# Patient Record
Sex: FEMALE | Race: BLACK OR AFRICAN AMERICAN | NOT HISPANIC OR LATINO | Employment: FULL TIME | URBAN - METROPOLITAN AREA
[De-identification: names, ages, dates, MRNs, and addresses within clinical notes are randomized per-mention and may not be internally consistent; named-entity substitution may affect disease eponyms.]

---

## 2017-06-30 ENCOUNTER — HOSPITAL ENCOUNTER (EMERGENCY)
Facility: HOSPITAL | Age: 27
Discharge: HOME | End: 2017-06-30
Attending: EMERGENCY MEDICINE
Payer: MEDICAID

## 2017-06-30 VITALS
HEART RATE: 94 BPM | WEIGHT: 136 LBS | DIASTOLIC BLOOD PRESSURE: 76 MMHG | BODY MASS INDEX: 23.22 KG/M2 | RESPIRATION RATE: 18 BRPM | SYSTOLIC BLOOD PRESSURE: 118 MMHG | HEIGHT: 64 IN | OXYGEN SATURATION: 98 % | TEMPERATURE: 98 F

## 2017-06-30 DIAGNOSIS — F41.0 ANXIETY ATTACK: Primary | ICD-10-CM

## 2017-06-30 PROCEDURE — 99283 EMERGENCY DEPT VISIT LOW MDM: CPT

## 2017-06-30 RX ORDER — HYDROXYZINE PAMOATE 25 MG/1
25 CAPSULE ORAL EVERY 6 HOURS PRN
Qty: 20 CAPSULE | Refills: 0 | Status: SHIPPED | OUTPATIENT
Start: 2017-06-30 | End: 2017-10-06

## 2017-07-01 NOTE — ED PROVIDER NOTES
Encounter Date: 6/30/2017    SCRIBE #1 NOTE: I, Mark El, am scribing for, and in the presence of, Pushpa Gonzalez MD. Other sections scribed: HPI, ROS.       History     Chief Complaint   Patient presents with    Anxiety     Patient states that she has intermittent anxiety for 6-8 months now.      CC: Anxiety  HPI: This 27 y.o. female smoker with Hx of anxiety presents to the ED c/o frequent daily anxiety attacks recently that she has been unable to control. Pt reports throbbing HAs, SOB, feelings of anxiety, and sometimes shakiness in her extremities. She denies any Sx at time of evaluation, but she would like help controlling these episodes until she can start seeing a therapist again. She states that she drinks water and attempts to control her breathing during the episodes, often with little or no relief. She does not take any medications. She denies any notable or increased stress in her life. She denies SI, loss of appetite, difficulty sleeping. She denies suicidal or homicidal ideation.             Review of patient's allergies indicates:   Allergen Reactions    Paroxetine Other (See Comments)     Lethargy     Past Medical History:   Diagnosis Date    Anxiety     Leaky heart valve      Past Surgical History:   Procedure Laterality Date    CARDIAC SURGERY       History reviewed. No pertinent family history.  Social History   Substance Use Topics    Smoking status: Current Every Day Smoker     Packs/day: 0.50    Smokeless tobacco: Never Used    Alcohol use Yes      Comment: occassionally     Review of Systems   Constitutional: Negative for appetite change, chills and fever.   HENT: Negative for ear pain, rhinorrhea, sore throat, trouble swallowing and voice change.    Eyes: Negative for pain and visual disturbance.   Respiratory: Negative for cough, choking, chest tightness and shortness of breath (during anxiety attacks, resolved at present).    Gastrointestinal: Negative for abdominal pain,  constipation, nausea and vomiting.   Genitourinary: Negative for dysuria and frequency.   Musculoskeletal: Negative for arthralgias, back pain, myalgias and neck stiffness.   Skin: Negative for rash and wound.   Neurological: Negative for seizures, weakness and headaches (during anxiety attacks, resolved at present).   Psychiatric/Behavioral: Negative for agitation, confusion, sleep disturbance and suicidal ideas. The patient is not nervous/anxious (resolved at present).        Physical Exam     Initial Vitals [06/30/17 2027]   BP Pulse Resp Temp SpO2   (!) 122/58 98 16 98.4 °F (36.9 °C) 100 %      MAP       79.33         Physical Exam    Nursing note and vitals reviewed.  Constitutional: She appears well-developed and well-nourished. She is not diaphoretic. No distress.   HENT:   Head: Normocephalic and atraumatic.   Right Ear: External ear normal.   Left Ear: External ear normal.   Nose: Nose normal.   Mouth/Throat: Oropharynx is clear and moist. No oropharyngeal exudate.   Eyes: Conjunctivae and EOM are normal. Pupils are equal, round, and reactive to light. Right eye exhibits no discharge. Left eye exhibits no discharge. No scleral icterus.   Neck: Normal range of motion. Neck supple. No thyromegaly present. No tracheal deviation present. No JVD present.   Cardiovascular: Normal rate, regular rhythm, normal heart sounds and intact distal pulses. Exam reveals no gallop and no friction rub.    No murmur heard.  Pulmonary/Chest: Breath sounds normal. No stridor. No respiratory distress. She has no wheezes. She exhibits no tenderness.   Abdominal: Soft. Bowel sounds are normal. She exhibits no distension and no mass. There is no tenderness. There is no rebound and no guarding.   Musculoskeletal: Normal range of motion. She exhibits no edema or tenderness.   Neurological: She is alert and oriented to person, place, and time. She has normal strength. She displays normal reflexes. No cranial nerve deficit or sensory  deficit.   Skin: Skin is warm and dry. Capillary refill takes less than 2 seconds. No rash noted. No erythema.   Psychiatric: She has a normal mood and affect. Thought content normal.         ED Course   Procedures  Labs Reviewed - No data to display          Medical Decision Makin-year-old female with history of anxiety presents the emergency department complaining of frequent anxiety exacerbations.  Denies suicidal or homicidal ideation.  On exam she is afebrile with stable vital signs.  She is nontoxic appearing.  She is neurovascularly intact.  There is no indication for emergent PEC.  Clinically I suspect the patient likely has anxiety.  I considered but do not suspect sepsis, depression, bipolar disorder, schizophrenia, electrolyte abnormality, cardiac arrhythmia, acs, acute intra-thoracic abnormality.  I spoke to her about relaxation techniques.  She agrees to follow-up with her psychologist as well as primary care doctor.  Will start Vistaril to use when necessary anxiety.  Return precautions were given.  She agrees the plan            Scribe Attestation:   Scribe #1: I performed the above scribed service and the documentation accurately describes the services I performed. I attest to the accuracy of the note.    Attending Attestation:           Physician Attestation for Scribe:  Physician Attestation Statement for Scribe #1: I, Pushpa Gonzalez MD, reviewed documentation, as scribed by Mark Gallegos in my presence, and it is both accurate and complete.                 ED Course     Clinical Impression:   There were no encounter diagnoses.                           Pushpa Gonzalez MD  [phone]

## 2017-07-01 NOTE — DISCHARGE INSTRUCTIONS
Take medications as directed. Follow-up with your primary care doctor and psychologist. Return for any new or worsening complaints.

## 2017-07-01 NOTE — ED TRIAGE NOTES
Patient presents to the ED w/ c/o anxiety. Pt reports she started having anxiety attacks last night and she cannot make them stop. Patient reports she can usually control her anxiety with breathing techniques. Pt is also reporting frontal headache.

## 2017-08-08 ENCOUNTER — HOSPITAL ENCOUNTER (EMERGENCY)
Facility: HOSPITAL | Age: 27
Discharge: HOME | End: 2017-08-08
Attending: EMERGENCY MEDICINE
Payer: MEDICAID

## 2017-08-08 VITALS
WEIGHT: 126 LBS | BODY MASS INDEX: 21.51 KG/M2 | DIASTOLIC BLOOD PRESSURE: 57 MMHG | HEIGHT: 64 IN | OXYGEN SATURATION: 98 % | SYSTOLIC BLOOD PRESSURE: 114 MMHG | TEMPERATURE: 99 F | RESPIRATION RATE: 18 BRPM | HEART RATE: 85 BPM

## 2017-08-08 DIAGNOSIS — R51.9 NONINTRACTABLE HEADACHE, UNSPECIFIED CHRONICITY PATTERN, UNSPECIFIED HEADACHE TYPE: Primary | ICD-10-CM

## 2017-08-08 LAB
B-HCG UR QL: NEGATIVE
CTP QC/QA: YES
DEPRECATED S PYO AG THROAT QL EIA: NEGATIVE

## 2017-08-08 PROCEDURE — 87081 CULTURE SCREEN ONLY: CPT

## 2017-08-08 PROCEDURE — 87880 STREP A ASSAY W/OPTIC: CPT

## 2017-08-08 PROCEDURE — 81025 URINE PREGNANCY TEST: CPT | Performed by: EMERGENCY MEDICINE

## 2017-08-08 PROCEDURE — 99283 EMERGENCY DEPT VISIT LOW MDM: CPT

## 2017-08-08 PROCEDURE — 25000003 PHARM REV CODE 250: Performed by: NURSE PRACTITIONER

## 2017-08-08 RX ORDER — ACETAMINOPHEN 325 MG/1
650 TABLET ORAL
Status: COMPLETED | OUTPATIENT
Start: 2017-08-08 | End: 2017-08-08

## 2017-08-08 RX ADMIN — ACETAMINOPHEN 650 MG: 325 TABLET, FILM COATED ORAL at 04:08

## 2017-08-08 NOTE — ED PROVIDER NOTES
Encounter Date: 8/8/2017       History     Chief Complaint   Patient presents with    Anxiety     States she is having an anxiety attack and its giving her a HA    Headache     Chief complaint: Headache    History of present illness: Patient is a 27-year-old female who presents for a chief complaint of headache for emergent consideration.  She states that the headache began last night around 6 PM and is frontal and constant she states that nothing alleviates the pain bright light aggravates the pain there is no associated nausea or vomiting the pain does not radiate it is worse at night.  The headache is like previous headaches that was relieved by Tylenol current severity is 9/10.  She does report chills at the present time.  She has not taken any medications for this current headache.    The history is provided by the patient. No  was used.     Review of patient's allergies indicates:   Allergen Reactions    Paroxetine Other (See Comments)     Lethargy     Past Medical History:   Diagnosis Date    Anxiety     Leaky heart valve      Past Surgical History:   Procedure Laterality Date    CARDIAC SURGERY       History reviewed. No pertinent family history.  Social History   Substance Use Topics    Smoking status: Current Every Day Smoker     Packs/day: 0.50    Smokeless tobacco: Never Used    Alcohol use Yes      Comment: occassionally     Review of Systems   Constitutional: Positive for chills. Negative for fatigue and fever.   HENT: Negative for congestion, ear discharge, ear pain, postnasal drip, rhinorrhea, sinus pressure, sneezing, sore throat and voice change.    Eyes: Negative for discharge and itching.   Respiratory: Negative for cough, shortness of breath and wheezing.    Cardiovascular: Negative for chest pain, palpitations and leg swelling.   Gastrointestinal: Negative for abdominal pain, constipation, diarrhea, nausea and vomiting.   Endocrine: Negative for polydipsia,  polyphagia and polyuria.   Genitourinary: Negative for dysuria, frequency, hematuria, urgency, vaginal bleeding, vaginal discharge and vaginal pain.   Musculoskeletal: Negative for arthralgias and myalgias.   Skin: Negative for rash and wound.   Neurological: Positive for headaches. Negative for dizziness, seizures, syncope, weakness and numbness.   Hematological: Negative for adenopathy. Does not bruise/bleed easily.   Psychiatric/Behavioral: Negative for self-injury and suicidal ideas. The patient is not nervous/anxious.        Physical Exam     Initial Vitals [08/08/17 1451]   BP Pulse Resp Temp SpO2   (!) 108/58 110 18 99 °F (37.2 °C) 100 %      MAP       74.67         Physical Exam    Nursing note and vitals reviewed.  Constitutional: She appears well-developed and well-nourished.   HENT:   Head: Normocephalic and atraumatic.   Right Ear: External ear normal.   Left Ear: External ear normal.   Nose: Nose normal.   Mouth/Throat: Oropharyngeal exudate present.   Eyes: Conjunctivae and EOM are normal. Pupils are equal, round, and reactive to light. Right eye exhibits no discharge. Left eye exhibits no discharge.   Neck: Normal range of motion.   Abdominal: She exhibits no distension.   Musculoskeletal: Normal range of motion.   Neurological: She is alert and oriented to person, place, and time.   Skin: Skin is dry. Capillary refill takes less than 2 seconds.         ED Course   Procedures  Labs Reviewed   THROAT SCREEN, RAPID   CULTURE, STREP A,  THROAT   POCT URINE PREGNANCY             Medical Decision Making:   Initial Assessment:   27 y.o. female presents for emergent evaluation of headache  Differential Diagnosis:   Cephalgia, migraine headache, tension headache  ED Management:  Following a thorough history and physical, the patient was given Tylenol 650 as well as had a urine collection.  Urine was tested for pregnancy which was negative and a strep screen was done which was also negative.  Patient reported  anxiety during initial triage but was given several opportunities to discuss anxiety during which she did not again discuss it.  She reported that her pain decreased to an 8 following administration of Tylenol and was discharged to home to continue to improve.  Stated that she will follow up with her primary care provider.  Care of the patient discussed with Dr. Lima who agreed with the assessment and plan.                Attending Attestation:     Physician Attestation Statement for NP/PA:   I discussed this assessment and plan of this patient with the NP/PA, but I did not personally examine the patient. The face to face encounter was performed by the NP/PA.    Other NP/PA Attestation Additions:      Medical Decision Makin yo F w/ headache, subjective fever and chills.  Neck supple.  Doubt meningitis or SAH.  I agree with plan.                 ED Course     Clinical Impression:   The encounter diagnosis was Nonintractable headache, unspecified chronicity pattern, unspecified headache type.    Disposition:   Disposition: Discharged  Condition: Stable                        Luis Alfredo Chaudhry DNP  [phone]       Darrion Lima MD  [phone]

## 2017-08-08 NOTE — DISCHARGE INSTRUCTIONS
Return to the Emergency department for any worsening or failure to improve, otherwise follow up with your primary care provider.

## 2017-08-10 LAB
BACTERIA THROAT CULT: NORMAL
BACTERIA THROAT CULT: NORMAL

## 2017-10-06 ENCOUNTER — HOSPITAL ENCOUNTER (EMERGENCY)
Facility: HOSPITAL | Age: 27
Discharge: HOME | End: 2017-10-06
Attending: EMERGENCY MEDICINE
Payer: MEDICAID

## 2017-10-06 VITALS
SYSTOLIC BLOOD PRESSURE: 110 MMHG | TEMPERATURE: 98 F | RESPIRATION RATE: 16 BRPM | BODY MASS INDEX: 19.63 KG/M2 | OXYGEN SATURATION: 100 % | WEIGHT: 115 LBS | DIASTOLIC BLOOD PRESSURE: 62 MMHG | HEART RATE: 63 BPM | HEIGHT: 64 IN

## 2017-10-06 DIAGNOSIS — R06.02 SHORTNESS OF BREATH: Primary | ICD-10-CM

## 2017-10-06 DIAGNOSIS — R07.9 CHEST PAIN: ICD-10-CM

## 2017-10-06 LAB
B-HCG UR QL: NEGATIVE
CTP QC/QA: YES

## 2017-10-06 PROCEDURE — 81025 URINE PREGNANCY TEST: CPT | Performed by: EMERGENCY MEDICINE

## 2017-10-06 PROCEDURE — 99284 EMERGENCY DEPT VISIT MOD MDM: CPT | Mod: 25

## 2017-10-06 RX ORDER — HYDROXYZINE HYDROCHLORIDE 25 MG/1
25 TABLET, FILM COATED ORAL 3 TIMES DAILY PRN
Qty: 20 TABLET | Refills: 0 | Status: SHIPPED | OUTPATIENT
Start: 2017-10-06 | End: 2018-06-14

## 2017-10-06 NOTE — DISCHARGE INSTRUCTIONS
Please return to the Emergency Department for any new or worsening symptoms including: worsening or changes in your pain or symptoms, fever, chest pain, shortness of breath, loss of consciousness, dizziness, weakness, or any other concerns.     Please follow up with your Primary Care Provider within in the week. If you do not have one, you may contact the one listed on your discharge paperwork or you may also call the [facility] Appointment Desk at [phone] to schedule an appointment with one.     You may also follow up with the [facility] for counseling/service.     Please take all medication as prescribed.

## 2017-10-06 NOTE — ED PROVIDER NOTES
"Encounter Date: 10/6/2017    SCRIBE #1 NOTE: I, Amrita Suzi, am scribing for, and in the presence of,  Vidal Blank NP. I have scribed the following portions of the note - Other sections scribed: HPI and ROS.       History     Chief Complaint   Patient presents with    Chest Pain     pt c/o intermittent sharp chest pain with activity. pt reports pain subsides w/o activity.     CC: Anxiety    HPI: This 27 y.o. female with medical history of anxiety and leaky heart valve presents to the ED c/o anxiety that began about 1 hour ago. Pt states "my anxiety started coming out from nowhere", noting that she was standing up at work at the time when she began to experience difficulty breathing. She reports that she also began to experience a "sharp" pain to the right side of the chest during the episode, but notes that the pain lasted a couple of seconds and has since resolved. She presently rates her anxiety 8/10, but notes that her breathing has improved. Pt states that she usually experiences anxiety attacks "every once in a while", noting that she experienced her last episode 1x month ago. She adds that she normally experiences difficulty breathing with each attack, but notes that it has never been accompanied by chest pain. Pt denies any new stressors, recent long distance travels, use of birth control and leg swelling. No other associated symptoms. No attempted treatment reported. No alleviating factors.           The history is provided by the patient. No  was used.     Review of patient's allergies indicates:   Allergen Reactions    Paroxetine Other (See Comments)     Lethargy     Past Medical History:   Diagnosis Date    Anxiety     Leaky heart valve      Past Surgical History:   Procedure Laterality Date    CARDIAC SURGERY       History reviewed. No pertinent family history.  Social History   Substance Use Topics    Smoking status: Current Every Day Smoker     Packs/day: 0.50    " Smokeless tobacco: Never Used    Alcohol use No      Comment: occassionally     Review of Systems   Constitutional: Negative for chills and fever.   HENT: Negative for congestion, ear pain, rhinorrhea and sore throat.    Eyes: Negative for pain and visual disturbance.   Respiratory: Positive for shortness of breath. Negative for cough.    Cardiovascular: Positive for chest pain (right sided; sice resolved).   Gastrointestinal: Negative for abdominal pain, diarrhea, nausea and vomiting.   Genitourinary: Negative for dysuria.   Musculoskeletal: Negative for back pain and neck pain.   Skin: Negative for rash.   Neurological: Negative for headaches.   Psychiatric/Behavioral: The patient is nervous/anxious.        Physical Exam     Initial Vitals [10/06/17 1125]   BP Pulse Resp Temp SpO2   (!) 126/58 73 18 98.7 °F (37.1 °C) 100 %      MAP       80.67         Physical Exam    Nursing note and vitals reviewed.  Constitutional: She appears well-developed and well-nourished. She is not diaphoretic. She is cooperative.  Non-toxic appearance. She does not have a sickly appearance. No distress.   HENT:   Head: Normocephalic and atraumatic.   Right Ear: External ear normal.   Left Ear: External ear normal.   Nose: Nose normal.   Mouth/Throat: Uvula is midline and oropharynx is clear and moist. No trismus in the jaw.   Eyes: Conjunctivae and EOM are normal.   Neck: Normal range of motion. No spinous process tenderness and no muscular tenderness present. No tracheal deviation present. No neck rigidity.   Cardiovascular: Normal rate, regular rhythm and normal heart sounds. Exam reveals no gallop and no friction rub.    No murmur heard.  Pulses:       Radial pulses are 2+ on the right side, and 2+ on the left side.   Pulmonary/Chest: Effort normal and breath sounds normal. No accessory muscle usage or stridor. No tachypnea and no bradypnea. No respiratory distress. She has no decreased breath sounds. She has no wheezes. She has no  rhonchi. She has no rales. She exhibits tenderness.   Abdominal: Soft. There is no tenderness. There is no guarding.   Musculoskeletal: Normal range of motion.   Lymphadenopathy:     She has no cervical adenopathy.   Neurological: She is alert and oriented to person, place, and time. She has normal strength. No sensory deficit. Coordination and gait normal. GCS eye subscore is 4. GCS verbal subscore is 5. GCS motor subscore is 6.   Skin: Skin is warm, dry and intact. Capillary refill takes less than 2 seconds. No bruising and no rash noted. No cyanosis or erythema. Nails show no clubbing.   Psychiatric: She has a normal mood and affect. Her behavior is normal. Thought content normal.         ED Course   Procedures  Labs Reviewed   POCT URINE PREGNANCY     EKG Readings: (Independently Interpreted)   Initial Reading: No STEMI. Previous EKG: Compared with most recent EKG Previous EKG Date: May 6, 2016. Rhythm: Normal Sinus Rhythm. Heart Rate: 81. Ectopy: No Ectopy. Conduction: RBBB. Axis: Normal.          Medical Decision Making:   History:   Old Medical Records: I decided to obtain old medical records.  Old Records Summarized: records from previous admission(s).       <> Summary of Records: Patient with multiple previous ER visits for chest wall pain, not intractable headache, and anxiety attack/reaction.  Clinical Tests:   Radiological Study: Ordered and Reviewed  Medical Tests: Ordered and Reviewed    Additional MDM:   PERC Rule:   Age is greater than or equal to 50 = 0.0  Heart Rate is greater than or equal to 100 = 0.0  SaO2 on room air < 95% = 0.0  Unilateral leg swelling = 0.0  Hemoptysis = 0.0  Recent surgery or trauma = 0.0  Prior PE or DVT =  0.0  Hormone use = 0.00  PERC Score = 0    APC / Resident Notes:   This is an evaluation of a 27 y.o. female that presents to the Emergency Department for shortness of breath that began while at work today.  She reports this felt like her usual anxiety type symptoms.   She reports it is gradually been improving.  During this episode she did report a short, few second episode of right midsternal sharp, sticking chest pain.  She does report it has resolved. Physical Exam shows a non-toxic, afebrile, and well appearing female.  Ears and throat without evidence of infection.  Breath sounds clear and equal to auscultation.  Heart regular rhythm and normal rate.  Minimal tenderness palpation over the right anterior chest wall.  She appears calm at this time.  She is not tachycardic, not hypoxic, not tachypnea.  She is otherwise PERC negative.  Vital Signs Are Reassuring. If available, previous records reviewed. RESULTS: UPT negative.  Chest x-ray with no evidence of pneumothorax, pneumonia, or pleural effusion.  EKG normal sinus rhythm at a rate of 81 with no acute ischemia or arrhythmia.  She does have a right bundle-branch block this was pre-existing a previous EKG.    My overall impression is chest pain, noncardiac and shortness of breath-they believe this is likely related to patient's previous history of anxiety. I considered, but at this time, do not suspect pneumothorax, pneumonia, pleural effusion, cardiac ischemia, acute MI, pulmonary embolism.    During her stay in the ER, she does report continued improvement in symptoms.  When I spoke with her prior to discharge, she reports feeling much better.  Given her continued improvement while emergency Department, do feel she is stable for discharge with outpatient follow-up D/C Meds: Atarax. Additional D/C Information: [facility] info. The diagnosis, treatment plan, instructions for follow-up and reevaluation with PCP/[facility] as well as ED return precautions were discussed and understanding was verbalized. All questions or concerns have been addressed. This case was discussed with Dr. Momin who is in agreement with my assessment and plan. LOUIS Raya, FNP-C        Scribe Attestation:   Scribe #1: I performed the above scribed  service and the documentation accurately describes the services I performed. I attest to the accuracy of the note.    Attending Attestation:           Physician Attestation for Scribe:  Physician Attestation Statement for Scribe #1: I, Vidal Blank NP, reviewed documentation, as scribed by Amrita Archer in my presence, and it is both accurate and complete.                 ED Course      Clinical Impression:   The primary encounter diagnosis was Shortness of breath. A diagnosis of Chest pain was also pertinent to this visit.    Disposition:   Disposition: Discharged  Condition: Stable                        EVANGELIST Paulson  10/06/17 1514

## 2017-11-24 ENCOUNTER — HOSPITAL ENCOUNTER (EMERGENCY)
Facility: HOSPITAL | Age: 27
Discharge: HOME | End: 2017-11-24
Attending: EMERGENCY MEDICINE
Payer: MEDICAID

## 2017-11-24 VITALS
BODY MASS INDEX: 24.8 KG/M2 | TEMPERATURE: 100 F | WEIGHT: 140 LBS | HEIGHT: 63 IN | RESPIRATION RATE: 16 BRPM | OXYGEN SATURATION: 100 % | HEART RATE: 75 BPM | SYSTOLIC BLOOD PRESSURE: 103 MMHG | DIASTOLIC BLOOD PRESSURE: 57 MMHG

## 2017-11-24 DIAGNOSIS — N93.9 VAGINAL BLEEDING: Primary | ICD-10-CM

## 2017-11-24 DIAGNOSIS — D25.9 UTERINE LEIOMYOMA, UNSPECIFIED LOCATION: ICD-10-CM

## 2017-11-24 LAB
ANISOCYTOSIS BLD QL SMEAR: SLIGHT
B-HCG UR QL: NEGATIVE
BASOPHILS # BLD AUTO: 0.03 K/UL
BASOPHILS NFR BLD: 0.4 %
BILIRUB UR QL STRIP: NEGATIVE
CLARITY UR: CLEAR
COLOR UR: YELLOW
CTP QC/QA: YES
DACRYOCYTES BLD QL SMEAR: ABNORMAL
DIFFERENTIAL METHOD: ABNORMAL
EOSINOPHIL # BLD AUTO: 0.1 K/UL
EOSINOPHIL NFR BLD: 1.5 %
ERYTHROCYTE [DISTWIDTH] IN BLOOD BY AUTOMATED COUNT: 22.1 %
GLUCOSE UR QL STRIP: NEGATIVE
HCT VFR BLD AUTO: 25.4 %
HGB BLD-MCNC: 7.7 G/DL
HGB UR QL STRIP: ABNORMAL
HYPOCHROMIA BLD QL SMEAR: ABNORMAL
KETONES UR QL STRIP: NEGATIVE
LEUKOCYTE ESTERASE UR QL STRIP: NEGATIVE
LYMPHOCYTES # BLD AUTO: 2.3 K/UL
LYMPHOCYTES NFR BLD: 33.3 %
MCH RBC QN AUTO: 16 PG
MCHC RBC AUTO-ENTMCNC: 30.3 G/DL
MCV RBC AUTO: 53 FL
MICROSCOPIC COMMENT: ABNORMAL
MONOCYTES # BLD AUTO: 0.5 K/UL
MONOCYTES NFR BLD: 7 %
NEUTROPHILS # BLD AUTO: 3.9 K/UL
NEUTROPHILS NFR BLD: 57.8 %
NITRITE UR QL STRIP: NEGATIVE
OVALOCYTES BLD QL SMEAR: ABNORMAL
PH UR STRIP: 6 [PH] (ref 5–8)
PLATELET # BLD AUTO: 453 K/UL
PLATELET BLD QL SMEAR: ABNORMAL
PMV BLD AUTO: ABNORMAL FL
POIKILOCYTOSIS BLD QL SMEAR: ABNORMAL
PROT UR QL STRIP: NEGATIVE
RBC # BLD AUTO: 4.8 M/UL
RBC #/AREA URNS HPF: 60 /HPF (ref 0–4)
SCHISTOCYTES BLD QL SMEAR: PRESENT
SP GR UR STRIP: 1.02 (ref 1–1.03)
SQUAMOUS #/AREA URNS HPF: 5 /HPF
TARGETS BLD QL SMEAR: ABNORMAL
URN SPEC COLLECT METH UR: ABNORMAL
UROBILINOGEN UR STRIP-ACNC: NEGATIVE EU/DL
WBC # BLD AUTO: 6.76 K/UL
WBC #/AREA URNS HPF: 1 /HPF (ref 0–5)

## 2017-11-24 PROCEDURE — 81000 URINALYSIS NONAUTO W/SCOPE: CPT

## 2017-11-24 PROCEDURE — 85025 COMPLETE CBC W/AUTO DIFF WBC: CPT

## 2017-11-24 PROCEDURE — 81025 URINE PREGNANCY TEST: CPT | Performed by: EMERGENCY MEDICINE

## 2017-11-24 PROCEDURE — 99285 EMERGENCY DEPT VISIT HI MDM: CPT

## 2017-11-24 PROCEDURE — 25000003 PHARM REV CODE 250: Performed by: NURSE PRACTITIONER

## 2017-11-24 RX ORDER — FERROUS SULFATE 325(65) MG
325 TABLET ORAL 2 TIMES DAILY
Qty: 180 TABLET | Refills: 0 | Status: SHIPPED | OUTPATIENT
Start: 2017-11-24 | End: 2018-02-22

## 2017-11-24 RX ORDER — NAPROXEN 500 MG/1
500 TABLET ORAL ONCE
Status: COMPLETED | OUTPATIENT
Start: 2017-11-24 | End: 2017-11-24

## 2017-11-24 RX ORDER — MAG HYDROX/ALUMINUM HYD/SIMETH 200-200-20
5 SUSPENSION, ORAL (FINAL DOSE FORM) ORAL
Status: COMPLETED | OUTPATIENT
Start: 2017-11-24 | End: 2017-11-24

## 2017-11-24 RX ORDER — NAPROXEN 500 MG/1
500 TABLET ORAL 2 TIMES DAILY WITH MEALS
Qty: 60 TABLET | Refills: 0 | Status: SHIPPED | OUTPATIENT
Start: 2017-11-24 | End: 2018-06-14

## 2017-11-24 RX ORDER — OXYCODONE AND ACETAMINOPHEN 5; 325 MG/1; MG/1
1 TABLET ORAL
Qty: 15 TABLET | Refills: 0 | Status: SHIPPED | OUTPATIENT
Start: 2017-11-24 | End: 2018-06-14

## 2017-11-24 RX ORDER — NORETHINDRONE ACETATE AND ETHINYL ESTRADIOL 1.5-30(21)
1 KIT ORAL DAILY
Qty: 30 TABLET | Refills: 6 | Status: SHIPPED | OUTPATIENT
Start: 2017-11-24 | End: 2018-10-05

## 2017-11-24 RX ADMIN — NAPROXEN 500 MG: 500 TABLET ORAL at 12:11

## 2017-11-24 RX ADMIN — ALUMINUM HYDROXIDE, MAGNESIUM HYDROXIDE, AND SIMETHICONE 5 ML: 200; 200; 20 SUSPENSION ORAL at 12:11

## 2017-11-24 NOTE — DISCHARGE INSTRUCTIONS
Your ultrasound shows that you have multiple small fibroids in your uterus.  This is most likely the cause of your pelvic cramping, severe bleeding, and anemia.    Start the birth control today.    Take naproxen for pain and inflammation.  Take percocet for BREAKTHROUGH pain.  Percocet may cause drowsiness, so please use with caution.    Take iron tablets twice daily to help build up your blood.  Do not take Iron on an empty stomach and try to eat lots of fiber in your diet to prevent constipation.  You can take COLACE or DOCUSATE (available over-the-counter) as a stool softener if you become constipated.    Follow-up with gynecology as soon as possible for further evaluation of your bleeding and fibroids.  If you do not have a gynecologist, you can call Dr. Celina Cain for an appointment or refer to our community resources page for nearby clinics.    Please note that birth control can increase your risk for developing blood clots.  Consider quitting smoking or smoking less, since both increase your risk for blood clots.    Return to the ER for any new or worsening symptoms or concerns.

## 2017-11-24 NOTE — ED PROVIDER NOTES
"Encounter Date: 11/24/2017       History     Chief Complaint   Patient presents with    Vaginal Bleeding     "I havent stopped bleeding from my cycle since last month. I'm cramping and bleeding heavy."      This is a 27-year-old female with a history of anxiety that comes to the emergency room complaining of severe vaginal bleeding this morning.  Patient reports that she is expected to have her next.  Starting on the 10th of next month, but she began bleeding severely this morning upon awakening.  She reports saturating 4 pads within 2-3 hours; denies history of irregular or severe periods, ovarian cysts, endometriosis, fibroids.  She does report a history of anemia during her previous pregnancy, but believes that it has resolved since then.  She denies any prior treatments with medications for her pain.  She also denies fevers, chills, body aches, back pain, vaginal pain, urinary symptoms, nausea, or vomiting.           Review of patient's allergies indicates:   Allergen Reactions    Paroxetine Other (See Comments)     Lethargy     Past Medical History:   Diagnosis Date    Anxiety     Leaky heart valve      Past Surgical History:   Procedure Laterality Date    CARDIAC SURGERY       History reviewed. No pertinent family history.  Social History   Substance Use Topics    Smoking status: Current Every Day Smoker     Packs/day: 0.50     Types: Cigarettes    Smokeless tobacco: Never Used    Alcohol use Yes      Comment: occassionally     Review of Systems   Constitutional: Negative for chills and fever.   Respiratory: Negative for cough and shortness of breath.    Cardiovascular: Negative for chest pain and palpitations.   Gastrointestinal: Negative for abdominal pain, diarrhea, nausea and vomiting.   Genitourinary: Positive for pelvic pain and vaginal bleeding. Negative for dysuria and vaginal pain.   Musculoskeletal: Negative for back pain.   Neurological: Negative for dizziness, weakness, numbness and " headaches.       Physical Exam     Initial Vitals [11/24/17 1108]   BP Pulse Resp Temp SpO2   119/60 87 16 98.7 °F (37.1 °C) 100 %      MAP       79.67         Physical Exam    Nursing note and vitals reviewed.  Constitutional: Vital signs are normal. She appears well-developed and well-nourished. She is not diaphoretic. She is active and cooperative. She does not appear ill. She appears distressed (crying, she appears uncomfortable).   Eyes: Pupils are equal, round, and reactive to light.   Abdominal: Soft. Normal appearance. She exhibits no distension and no mass. There is no hepatosplenomegaly. There is tenderness (mild pelvic). There is no rigidity, no rebound, no guarding, no CVA tenderness and negative Bustamante's sign. No hernia.   Genitourinary: Cervix exhibits no motion tenderness, no discharge and no friability. Right adnexum displays no mass and no tenderness. Left adnexum displays no mass and no tenderness. There is bleeding (scant blood in vault with small clots) in the vagina. No erythema or tenderness in the vagina. No foreign body in the vagina. No signs of injury around the vagina. No vaginal discharge found.   Genitourinary Comments: External genitalia normal in appearance   Neurological: She is alert and oriented to person, place, and time.         ED Course   Procedures  Labs Reviewed   CBC W/ AUTO DIFFERENTIAL - Abnormal; Notable for the following:        Result Value    Hemoglobin 7.7 (*)     Hematocrit 25.4 (*)     MCV 53 (*)     MCH 16.0 (*)     MCHC 30.3 (*)     RDW 22.1 (*)     Platelets 453 (*)     Platelet Estimate Increased (*)     All other components within normal limits   URINALYSIS - Abnormal; Notable for the following:     Occult Blood UA 3+ (*)     All other components within normal limits   URINALYSIS MICROSCOPIC - Abnormal; Notable for the following:     RBC, UA 60 (*)     All other components within normal limits   POCT URINE PREGNANCY                Additional MDM:   Comments:  This is an urgent evaluation of a 27-year-old female that presents to the emergency room complaining of severe pelvic pain and vaginal bleeding today.  Patient denies a history of irregular periods; she is 2 weeks early for her.  Her UPT is negative.  She reports saturating 4 pads within 2 hours.  On exam, the patient is crying in pain, but is afebrile and with normal vital signs.  She does not appear ill or toxic.  There is no focal tenderness along the pelvis on my exam.  Differentials included: Anemia, ovarian cysts, uterine fibroids, endometriosis, UTI, vaginal infection.  Labs notable for an H&H of 7.7 and 25.4.  There is also significant microcytosis.  Possibly iron deficiency anemia or thalassemia?  Pelvic exam revealed a small amount of blood in the vaginal vault with clots.  There were no adnexal masses or tenderness.  I am not concerned for cervicitis, TOA, torsion.  Transvaginal ultrasound revealed 4 small uterine fibroids, the largest of which measures 1.6 cm . there was normal Doppler flow to both ovaries.  There is no free fluid identified within the pelvis.  The results were discussed with the patient.  She denies any headache, dizziness, lightheadedness, weakness, shortness of breath, palpitations.  Her medical record, she has been anemic in the past and I suspect that her baseline is low.  Given that she is asymptomatic, I believe blood transfusion can be deferred at this time as long as the bleeding can be controlled.  Will start on oral contraceptive today.  The patient denies a history of prior thrombus, but is a smoker.  I explained to her that smoking and taking OCP but her at higher risk for blood clots and that she should quit smoking as soon as possible or abstain while she is taking OCP.  I also cautioned her to return to the emergency room for any symptoms of a PE or DVT.  Patient verbalized understanding.  Also will start on iron supplementation to be taken twice daily.  Rx naproxen and  Percocet for pain control.  She was given strict return precautions for any new or worsening symptoms or concerns, including but not limited to dizziness, weakness, syncope..                 ED Course      Clinical Impression:   The primary encounter diagnosis was Vaginal bleeding. A diagnosis of Uterine leiomyoma, unspecified location was also pertinent to this visit.    Disposition:   Disposition: Discharged  Condition: Stable                        Nena Calderon NP  11/24/17 6482

## 2017-11-28 ENCOUNTER — HOSPITAL ENCOUNTER (EMERGENCY)
Facility: HOSPITAL | Age: 27
Discharge: HOME | End: 2017-11-28
Attending: EMERGENCY MEDICINE
Payer: MEDICAID

## 2017-11-28 VITALS
DIASTOLIC BLOOD PRESSURE: 59 MMHG | OXYGEN SATURATION: 100 % | BODY MASS INDEX: 23.92 KG/M2 | HEART RATE: 58 BPM | SYSTOLIC BLOOD PRESSURE: 109 MMHG | WEIGHT: 135 LBS | RESPIRATION RATE: 20 BRPM | HEIGHT: 63 IN | TEMPERATURE: 99 F

## 2017-11-28 DIAGNOSIS — R42 DIZZINESS: ICD-10-CM

## 2017-11-28 DIAGNOSIS — N92.1 MENORRHAGIA WITH IRREGULAR CYCLE: Primary | ICD-10-CM

## 2017-11-28 DIAGNOSIS — N73.0 PID (ACUTE PELVIC INFLAMMATORY DISEASE): ICD-10-CM

## 2017-11-28 DIAGNOSIS — R10.32 LLQ PAIN: ICD-10-CM

## 2017-11-28 DIAGNOSIS — R11.2 NAUSEA AND VOMITING, INTRACTABILITY OF VOMITING NOT SPECIFIED, UNSPECIFIED VOMITING TYPE: ICD-10-CM

## 2017-11-28 LAB
ALBUMIN SERPL BCP-MCNC: 3.8 G/DL
ALP SERPL-CCNC: 52 U/L
ALT SERPL W/O P-5'-P-CCNC: 10 U/L
ANION GAP SERPL CALC-SCNC: 9 MMOL/L
ANISOCYTOSIS BLD QL SMEAR: ABNORMAL
APTT BLDCRRT: 23.8 SEC
AST SERPL-CCNC: 15 U/L
B-HCG UR QL: NEGATIVE
BASOPHILS # BLD AUTO: 0.03 K/UL
BASOPHILS NFR BLD: 0.5 %
BILIRUB SERPL-MCNC: 0.3 MG/DL
BILIRUB UR QL STRIP: NEGATIVE
BUN SERPL-MCNC: 9 MG/DL
CALCIUM SERPL-MCNC: 9.4 MG/DL
CHLORIDE SERPL-SCNC: 109 MMOL/L
CLARITY UR: CLEAR
CO2 SERPL-SCNC: 25 MMOL/L
COLOR UR: YELLOW
CREAT SERPL-MCNC: 0.8 MG/DL
CTP QC/QA: YES
DACRYOCYTES BLD QL SMEAR: ABNORMAL
DIFFERENTIAL METHOD: ABNORMAL
EOSINOPHIL # BLD AUTO: 0 K/UL
EOSINOPHIL NFR BLD: 0.7 %
ERYTHROCYTE [DISTWIDTH] IN BLOOD BY AUTOMATED COUNT: 20.2 %
EST. GFR  (AFRICAN AMERICAN): >60 ML/MIN/1.73 M^2
EST. GFR  (NON AFRICAN AMERICAN): >60 ML/MIN/1.73 M^2
GLUCOSE SERPL-MCNC: 88 MG/DL
GLUCOSE UR QL STRIP: NEGATIVE
HCT VFR BLD AUTO: 29.3 %
HGB BLD-MCNC: 8.6 G/DL
HGB UR QL STRIP: NEGATIVE
HYPOCHROMIA BLD QL SMEAR: ABNORMAL
INR PPP: 1.1
KETONES UR QL STRIP: NEGATIVE
LEUKOCYTE ESTERASE UR QL STRIP: NEGATIVE
LYMPHOCYTES # BLD AUTO: 2.2 K/UL
LYMPHOCYTES NFR BLD: 35.9 %
MCH RBC QN AUTO: 16 PG
MCHC RBC AUTO-ENTMCNC: 29.4 G/DL
MCV RBC AUTO: 55 FL
MONOCYTES # BLD AUTO: 0.4 K/UL
MONOCYTES NFR BLD: 6.5 %
NEUTROPHILS # BLD AUTO: 3.5 K/UL
NEUTROPHILS NFR BLD: 56.7 %
NITRITE UR QL STRIP: NEGATIVE
OVALOCYTES BLD QL SMEAR: ABNORMAL
PH UR STRIP: 5 [PH] (ref 5–8)
PLATELET # BLD AUTO: 512 K/UL
PLATELET BLD QL SMEAR: ABNORMAL
PMV BLD AUTO: ABNORMAL FL
POIKILOCYTOSIS BLD QL SMEAR: ABNORMAL
POLYCHROMASIA BLD QL SMEAR: ABNORMAL
POTASSIUM SERPL-SCNC: 3.5 MMOL/L
PROT SERPL-MCNC: 7.7 G/DL
PROT UR QL STRIP: NEGATIVE
PROTHROMBIN TIME: 11.7 SEC
RBC # BLD AUTO: 5.37 M/UL
SCHISTOCYTES BLD QL SMEAR: PRESENT
SODIUM SERPL-SCNC: 143 MMOL/L
SP GR UR STRIP: >1.03 (ref 1–1.03)
TARGETS BLD QL SMEAR: ABNORMAL
URN SPEC COLLECT METH UR: ABNORMAL
UROBILINOGEN UR STRIP-ACNC: ABNORMAL EU/DL
WBC # BLD AUTO: 6.13 K/UL

## 2017-11-28 PROCEDURE — 85730 THROMBOPLASTIN TIME PARTIAL: CPT

## 2017-11-28 PROCEDURE — 99284 EMERGENCY DEPT VISIT MOD MDM: CPT | Mod: 25

## 2017-11-28 PROCEDURE — 63600175 PHARM REV CODE 636 W HCPCS: Performed by: PHYSICIAN ASSISTANT

## 2017-11-28 PROCEDURE — 85025 COMPLETE CBC W/AUTO DIFF WBC: CPT

## 2017-11-28 PROCEDURE — 85610 PROTHROMBIN TIME: CPT

## 2017-11-28 PROCEDURE — 81003 URINALYSIS AUTO W/O SCOPE: CPT

## 2017-11-28 PROCEDURE — 25000003 PHARM REV CODE 250: Performed by: PHYSICIAN ASSISTANT

## 2017-11-28 PROCEDURE — 93010 ELECTROCARDIOGRAM REPORT: CPT | Mod: ,,, | Performed by: INTERNAL MEDICINE

## 2017-11-28 PROCEDURE — 96374 THER/PROPH/DIAG INJ IV PUSH: CPT

## 2017-11-28 PROCEDURE — 81025 URINE PREGNANCY TEST: CPT | Performed by: NURSE PRACTITIONER

## 2017-11-28 PROCEDURE — 96372 THER/PROPH/DIAG INJ SC/IM: CPT

## 2017-11-28 PROCEDURE — 93005 ELECTROCARDIOGRAM TRACING: CPT

## 2017-11-28 PROCEDURE — 96361 HYDRATE IV INFUSION ADD-ON: CPT

## 2017-11-28 PROCEDURE — 80053 COMPREHEN METABOLIC PANEL: CPT

## 2017-11-28 RX ORDER — CEFTRIAXONE 500 MG/1
250 INJECTION, POWDER, FOR SOLUTION INTRAMUSCULAR; INTRAVENOUS
Status: COMPLETED | OUTPATIENT
Start: 2017-11-28 | End: 2017-11-28

## 2017-11-28 RX ORDER — DICYCLOMINE HYDROCHLORIDE 10 MG/ML
20 INJECTION INTRAMUSCULAR
Status: COMPLETED | OUTPATIENT
Start: 2017-11-28 | End: 2017-11-28

## 2017-11-28 RX ORDER — SODIUM CHLORIDE 9 MG/ML
1000 INJECTION, SOLUTION INTRAVENOUS
Status: COMPLETED | OUTPATIENT
Start: 2017-11-28 | End: 2017-11-28

## 2017-11-28 RX ORDER — MORPHINE SULFATE 5 MG/ML
4 INJECTION, SOLUTION INTRAMUSCULAR; INTRAVENOUS
Status: DISCONTINUED | OUTPATIENT
Start: 2017-11-28 | End: 2017-11-28

## 2017-11-28 RX ORDER — DOXYCYCLINE HYCLATE 100 MG
100 TABLET ORAL
Status: COMPLETED | OUTPATIENT
Start: 2017-11-28 | End: 2017-11-28

## 2017-11-28 RX ORDER — IBUPROFEN 600 MG/1
600 TABLET ORAL EVERY 6 HOURS PRN
Qty: 20 TABLET | Refills: 0 | Status: SHIPPED | OUTPATIENT
Start: 2017-11-28 | End: 2017-12-03

## 2017-11-28 RX ORDER — DOXYCYCLINE 100 MG/1
100 CAPSULE ORAL EVERY 12 HOURS
Qty: 28 CAPSULE | Refills: 0 | Status: SHIPPED | OUTPATIENT
Start: 2017-11-28 | End: 2017-12-12

## 2017-11-28 RX ORDER — ONDANSETRON 4 MG/1
4 TABLET, ORALLY DISINTEGRATING ORAL EVERY 6 HOURS PRN
Qty: 15 TABLET | Refills: 0 | Status: SHIPPED | OUTPATIENT
Start: 2017-11-28 | End: 2017-12-03

## 2017-11-28 RX ORDER — ONDANSETRON 2 MG/ML
4 INJECTION INTRAMUSCULAR; INTRAVENOUS
Status: COMPLETED | OUTPATIENT
Start: 2017-11-28 | End: 2017-11-28

## 2017-11-28 RX ORDER — DICYCLOMINE HYDROCHLORIDE 20 MG/1
20 TABLET ORAL 4 TIMES DAILY
Qty: 28 TABLET | Refills: 0 | Status: SHIPPED | OUTPATIENT
Start: 2017-11-28 | End: 2017-12-05

## 2017-11-28 RX ADMIN — CEFTRIAXONE SODIUM 250 MG: 500 INJECTION, POWDER, FOR SOLUTION INTRAMUSCULAR; INTRAVENOUS at 12:11

## 2017-11-28 RX ADMIN — ONDANSETRON 4 MG: 2 INJECTION INTRAMUSCULAR; INTRAVENOUS at 11:11

## 2017-11-28 RX ADMIN — DOXYCYCLINE HYCLATE 100 MG: 100 TABLET, COATED ORAL at 12:11

## 2017-11-28 RX ADMIN — DICYCLOMINE HYDROCHLORIDE 20 MG: 10 INJECTION INTRAMUSCULAR at 12:11

## 2017-11-28 RX ADMIN — SODIUM CHLORIDE 1000 ML: 0.9 INJECTION, SOLUTION INTRAVENOUS at 11:11

## 2017-11-28 NOTE — ED PROVIDER NOTES
"Encounter Date: 11/28/2017    SCRIBE #1 NOTE: I, Susannah Frankel, am scribing for, and in the presence of,  Yazmin Cobb PA-C. I have scribed the following portions of the note - Other sections scribed: ROS and HPI.       History     Chief Complaint   Patient presents with    Vaginal Bleeding     " i'm bleeding, cramping" reports being informed if bleeding didn't stop possible blood tranfusion, reports vag bleeding x 4 days, irregular menstrual      CC: Vaginal Bleeding    HPI: This 27 y.o. female presents to the ED complaining of vaginal bleeding for last 4-5 days. Bleeding is worse today and increased from 7 tampons per day to 6 regular tampons in the last 8 hours prior to arrival today. She was evaluated at this facility 2 days ago and diagnosed with fibroids and instructed to follow up with OBGYN. However, pt states she has not established follow up. She is compliant with Iron pills and OCPs prescribed at last ER visit. She was told to return to ER if her bleeding worsened which is why she presented today. She reports lower abdominal cramps consistent with her prior menses but more severe, 10/10. She reports dizziness consistent with her periods. Since her last visit, pt reports she developed nausea and 3 episodes of emesis. Denies hx of blood transfusion. Denies hx of blood disorder.       The history is provided by the patient. No  was used.     Review of patient's allergies indicates:   Allergen Reactions    Paroxetine Other (See Comments)     Lethargy     Past Medical History:   Diagnosis Date    Anxiety     Leaky heart valve      Past Surgical History:   Procedure Laterality Date    CARDIAC SURGERY       History reviewed. No pertinent family history.  Social History   Substance Use Topics    Smoking status: Current Every Day Smoker     Packs/day: 0.50     Types: Cigarettes    Smokeless tobacco: Never Used    Alcohol use Yes      Comment: occassionally     Review of Systems "   Constitutional: Positive for appetite change. Negative for chills and fever.   Respiratory: Negative for shortness of breath.    Cardiovascular: Negative for chest pain.   Gastrointestinal: Positive for abdominal pain and vomiting. Negative for nausea.   Genitourinary: Positive for vaginal bleeding. Negative for dysuria, frequency and urgency.   Neurological: Positive for dizziness.       Physical Exam     Initial Vitals [11/28/17 0921]   BP Pulse Resp Temp SpO2   119/61 109 18 98.9 °F (37.2 °C) 100 %      MAP       80.33         Physical Exam    Nursing note and vitals reviewed.  Constitutional: She appears well-developed and well-nourished.   HENT:   Head: Normocephalic.   Right Ear: External ear normal.   Left Ear: External ear normal.   Nose: Nose normal.   Mouth/Throat: Oropharynx is clear and moist.   Eyes: Conjunctivae are normal.   Cardiovascular: Normal rate and regular rhythm. Exam reveals no gallop and no friction rub.    No murmur heard.  Pulmonary/Chest: Breath sounds normal. No respiratory distress. She has no wheezes. She has no rhonchi. She has no rales.   Abdominal: Soft. Bowel sounds are normal. She exhibits no distension. There is tenderness in the suprapubic area and left lower quadrant. There is no rigidity, no rebound, no guarding, no CVA tenderness, no tenderness at McBurney's point and negative Bustamante's sign.   Genitourinary:   Genitourinary Comments: Scant amount of blood in vaginal vault with mild bleeding from cervical os. There is left adnexal TTp and CMT with no palpable masses.    Musculoskeletal: Normal range of motion.   Lymphadenopathy:     She has no cervical adenopathy.   Neurological: She is alert. She has normal strength. No cranial nerve deficit or sensory deficit.   Skin: Skin is warm and dry.   Psychiatric: She has a normal mood and affect.         ED Course   Procedures  Labs Reviewed   CBC W/ AUTO DIFFERENTIAL - Abnormal; Notable for the following:        Result Value     Hemoglobin 8.6 (*)     Hematocrit 29.3 (*)     MCV 55 (*)     MCH 16.0 (*)     MCHC 29.4 (*)     RDW 20.2 (*)     Platelets 512 (*)     Platelet Estimate Increased (*)     All other components within normal limits   COMPREHENSIVE METABOLIC PANEL - Abnormal; Notable for the following:     Alkaline Phosphatase 52 (*)     All other components within normal limits   URINALYSIS - Abnormal; Notable for the following:     Specific Gravity, UA >1.030 (*)     Urobilinogen, UA 4.0-6.0 (*)     All other components within normal limits   APTT   PROTIME-INR   POCT URINE PREGNANCY             Medical Decision Making:   Initial Assessment:   Pt is a 26 y/o female with history of anemia who presents for evaluation of heavy vaginal bleeding (6 tampons in past 8 hours from 7 tampons per day) with lower abdominal cramping and dizziness x 5 days and 2 day history of NV.   Differential Diagnosis:   Menorrhagia, ectopic pregnancy, threatened AB, complete AB, anemia, UTI, STI, PID, TOA, viral syndrome, acute abdomen  ED Management:  Pt was evaluated at this facility for these symptoms 2 days ago and diagnosed with fibroids and instructed to begin taking iron pills for anemia and OCPs and follow up with OBGYN. Pt returned to ER today due to increased bleeding and development of nausea and vomiting within the past 2 days. Pt is afebrile, tachycardic, appears fatigued in NAD. Oral mucosa dry. Mild lower suprapubic and LLQ TTP with no peritoneal signs. Pelvic exam remarkable for CMT and left adnexal TTP. Will treat pt for PID today. Rocephin and doxycycline given in ED. UPT negative. Pt given IVF zofran and Bentyl in the ED. She reports feeling much better. CBC with anemia but improved from labs drawn previously. No transfusion required at this time. Based on exam it appears bleeding is slowing. I think this is menorrhagia 2/2 fibroids. Instructed pt to continue taking OCP and Iron pills as previously prescribed. OBGYN follow up as soon as  possible. ER return precautions discussedincluding worsening symptoms, heavier bleeding, syncope, or as needed. Dsicussed pt with Dr. Araujo who agrees with assessment and plan.             Scribe Attestation:   Scribe #1: I performed the above scribed service and the documentation accurately describes the services I performed. I attest to the accuracy of the note.    Attending Attestation:           Physician Attestation for Scribe:  Physician Attestation Statement for Scribe #1: I,  Yazmin Cobb PA-C, reviewed documentation, as scribed by Susannah Frankel in my presence, and it is both accurate and complete.                 ED Course      Clinical Impression:   The primary encounter diagnosis was Menorrhagia with irregular cycle. Diagnoses of Dizziness, LLQ pain, PID (acute pelvic inflammatory disease), and Nausea and vomiting, intractability of vomiting not specified, unspecified vomiting type were also pertinent to this visit.                           Yazmin Cobb PA-C  11/29/17 2517

## 2017-11-28 NOTE — DISCHARGE INSTRUCTIONS
Please take Doxycycline as prescribed for PID.    You can take Ibuprofen and Bentyl as needed for abdominal pain and Zofran as needed for nausea.     Follow up with OBGYN as soon as possible for evaluation and treatment of your vaginal bleeding.    Continue taking iron pills and birth control as previously prescribed.     Return to ER if you develop worsening symptoms, worsening dizziness or lightheadedness, chest pain or as needed.

## 2018-04-18 ENCOUNTER — HOSPITAL ENCOUNTER (EMERGENCY)
Facility: HOSPITAL | Age: 28
Discharge: HOME | End: 2018-04-18
Attending: EMERGENCY MEDICINE
Payer: MEDICAID

## 2018-04-18 VITALS
RESPIRATION RATE: 16 BRPM | BODY MASS INDEX: 21.85 KG/M2 | HEART RATE: 65 BPM | OXYGEN SATURATION: 100 % | DIASTOLIC BLOOD PRESSURE: 66 MMHG | SYSTOLIC BLOOD PRESSURE: 113 MMHG | WEIGHT: 128 LBS | TEMPERATURE: 98 F | HEIGHT: 64 IN

## 2018-04-18 DIAGNOSIS — R42 DIZZY: Primary | ICD-10-CM

## 2018-04-18 DIAGNOSIS — Z86.59 HISTORY OF ANXIETY: ICD-10-CM

## 2018-04-18 DIAGNOSIS — R07.89 CHEST DISCOMFORT: ICD-10-CM

## 2018-04-18 LAB
B-HCG UR QL: NEGATIVE
CTP QC/QA: YES
POCT GLUCOSE: 78 MG/DL (ref 70–110)

## 2018-04-18 PROCEDURE — 99284 EMERGENCY DEPT VISIT MOD MDM: CPT | Mod: 25

## 2018-04-18 PROCEDURE — 93010 ELECTROCARDIOGRAM REPORT: CPT | Mod: ,,, | Performed by: INTERNAL MEDICINE

## 2018-04-18 PROCEDURE — 82962 GLUCOSE BLOOD TEST: CPT

## 2018-04-18 PROCEDURE — 25000003 PHARM REV CODE 250: Performed by: PHYSICIAN ASSISTANT

## 2018-04-18 PROCEDURE — 81025 URINE PREGNANCY TEST: CPT | Performed by: EMERGENCY MEDICINE

## 2018-04-18 PROCEDURE — 93005 ELECTROCARDIOGRAM TRACING: CPT

## 2018-04-18 RX ORDER — LORAZEPAM 0.5 MG/1
2 TABLET ORAL
Status: COMPLETED | OUTPATIENT
Start: 2018-04-18 | End: 2018-04-18

## 2018-04-18 RX ADMIN — LORAZEPAM 2 MG: 0.5 TABLET ORAL at 09:04

## 2018-04-19 NOTE — ED PROVIDER NOTES
"Encounter Date: 4/18/2018    SCRIBE #1 NOTE: I, Divine Oleary, am scribing for, and in the presence of,  Artem Werner PA-C. I have scribed the following portions of the note - Other sections scribed: HPI and ROS.       History     Chief Complaint   Patient presents with    Anxiety     "My anxiety is starting to mess with me to where it comes every 30 to 40 minutes." Currently calm and cooperative. Denies SI/HI.      CC: Anxiety    HPI: The pt is a 27 y .o. F w/ a pmhx of anxiety (was prescribed xanax but only takes it "as needed") who presents to the ED c/o an acute onset episode of dizziness (room spinning) and SOB that started 7 hrs ago while pt was at rest. Pt states that she has experienced SOB w/ her past anxiety attacks but states that dizziness is a new issue. Pt states that SOB lasted a couple of seconds but reports that she has intermittently been feeling dizzy since the onset of first episode. She says that dizziness exacerbates w/ movement. Pt's LMP began 3 days ago. No attempted treatments reported. She otherwise denies recent increase in stressors as well as otalgia, tinnitus, chest pain, abdominal pain, n/v/d, leg pain, and other associated symptoms.    Pmhx also includes anemia and leaky heart valve w/ surgery.    Clarified triage note:  Denies chest pain and chest tightness. Denies pain entirely.       The history is provided by the patient. No  was used.     Review of patient's allergies indicates:   Allergen Reactions    Paroxetine Other (See Comments)     Lethargy     Past Medical History:   Diagnosis Date    Anxiety     Leaky heart valve      Past Surgical History:   Procedure Laterality Date    CARDIAC SURGERY       No family history on file.  Social History   Substance Use Topics    Smoking status: Current Every Day Smoker     Packs/day: 0.50     Types: Cigarettes    Smokeless tobacco: Never Used    Alcohol use Yes      Comment: occassionally     Review of Systems "   Constitutional: Negative for chills, diaphoresis and fever.   HENT: Negative for ear pain, sore throat and tinnitus.    Eyes: Negative for visual disturbance.   Respiratory: Positive for shortness of breath. Negative for cough.    Cardiovascular: Negative for chest pain.   Gastrointestinal: Negative for abdominal pain, diarrhea, nausea and vomiting.   Genitourinary: Negative for dysuria.   Musculoskeletal: Negative for back pain.        (-) arm or leg pain   Skin: Negative for rash.   Neurological: Positive for dizziness (room spinning). Negative for headaches.   Psychiatric/Behavioral: Negative for suicidal ideas. The patient is nervous/anxious.        Physical Exam     Initial Vitals [04/18/18 2026]   BP Pulse Resp Temp SpO2   (!) 112/59 78 18 98.4 °F (36.9 °C) 100 %      MAP       76.67         Physical Exam    Nursing note and vitals reviewed.  Constitutional: She appears well-developed and well-nourished. She is not diaphoretic. No distress.   HENT:   Head: Normocephalic and atraumatic.   Right Ear: Tympanic membrane, external ear and ear canal normal. No mastoid tenderness.   Left Ear: Tympanic membrane, external ear and ear canal normal. No mastoid tenderness.   Nose: Nose normal. No rhinorrhea. Right sinus exhibits no maxillary sinus tenderness and no frontal sinus tenderness. Left sinus exhibits no maxillary sinus tenderness and no frontal sinus tenderness.   Mouth/Throat: Uvula is midline and oropharynx is clear and moist. No oropharyngeal exudate, posterior oropharyngeal edema, posterior oropharyngeal erythema or tonsillar abscesses.   Eyes: Conjunctivae and EOM are normal. Right eye exhibits no discharge. Left eye exhibits no discharge.   No vertical nystagmus   Neck: Normal range of motion. No tracheal deviation present. No JVD present.   Cardiovascular: Normal rate, regular rhythm and normal heart sounds. Exam reveals no friction rub.    No murmur heard.  Pulmonary/Chest: Breath sounds normal. No  stridor. No tachypnea. No respiratory distress. She has no decreased breath sounds. She has no wheezes. She has no rhonchi. She has no rales. She exhibits no tenderness.   Abdominal: There is no tenderness. There is no rigidity, no rebound and no guarding.   Musculoskeletal: Normal range of motion.   Lymphadenopathy:     She has no cervical adenopathy.   Neurological: She is alert and oriented to person, place, and time. She displays no tremor. No cranial nerve deficit. She displays no seizure activity. Coordination and gait normal. GCS eye subscore is 4. GCS verbal subscore is 5. GCS motor subscore is 6.   Skin: Skin is warm and dry. No rash and no abscess noted. No erythema. No pallor.         ED Course   Procedures  Labs Reviewed   POCT URINE PREGNANCY   POCT GLUCOSE             Medical Decision Making:   History:   Old Medical Records: I decided to obtain old medical records.  Initial Assessment:   19-year-old female with history of anxiety presents to the emergency department for dizziness.  Patient is unsure if her current symptoms are related to her history of anxiety.  Denies pain.   Clinical Tests:   Lab Tests: Ordered and Reviewed  Radiological Study: Ordered and Reviewed  Medical Tests: Ordered and Reviewed  ED Management:  Obtain screening test while treated with Ativan.      Patient now asymptomatic.  Remains well-appearing.  Vitals stable.  Remains neurologically intact. Acting normal per friend that accompanies.     EKG shows no cardiac arrhythmia.  Chest x-ray ordered from triage shows no pneumothorax, pneumonia, or widening mediastinum.  Not hypoglycemic.  Found to be orthostatic positive by way of heart rate; likely contributing to symptoms.  Patient advises she is able to tolerate by mouth without complication.  I discuss obtaining head CT with Dr. Crespo for potential intracranial pathology; we both feel this test would be low yield at this time but very strict return precautions were given to  patient who is understanding and agreeable to plan.    Advising PCP and psychiatry follow up. Strict return precautions discussed. Patient agreeable to plan.   Other:   I have discussed this case with another health care provider.       <> Summary of the Discussion: Case discussed with Dr. Crespo who is in agreement with my assessment and plan.             Scribe Attestation:   Scribe #1: I performed the above scribed service and the documentation accurately describes the services I performed. I attest to the accuracy of the note.    Attending Attestation:           Physician Attestation for Scribe:  Physician Attestation Statement for Scribe #1: I, Artem Werner PA-C, reviewed documentation, as scribed by Divine Oleary in my presence, and it is both accurate and complete.                    Clinical Impression:   The primary encounter diagnosis was Dizzy. Diagnoses of Chest discomfort and History of anxiety were also pertinent to this visit.    Disposition:   Disposition: Discharged  Condition: Stable                        Artem Werner PA-C  04/19/18 0031

## 2018-04-19 NOTE — ED NOTES
Past Medical History:   Diagnosis Date    Anxiety     Leaky heart valve      Pt presented to ed with co anxiety that came on around 1500.  She states that she has run out of her Xanax prescription.  Pt also co diziness.

## 2018-06-14 ENCOUNTER — HOSPITAL ENCOUNTER (EMERGENCY)
Facility: HOSPITAL | Age: 28
Discharge: HOME | End: 2018-06-14
Attending: EMERGENCY MEDICINE
Payer: MEDICAID

## 2018-06-14 VITALS
TEMPERATURE: 100 F | HEIGHT: 64 IN | RESPIRATION RATE: 15 BRPM | OXYGEN SATURATION: 99 % | WEIGHT: 136 LBS | BODY MASS INDEX: 23.22 KG/M2 | HEART RATE: 88 BPM | SYSTOLIC BLOOD PRESSURE: 131 MMHG | DIASTOLIC BLOOD PRESSURE: 70 MMHG

## 2018-06-14 DIAGNOSIS — R07.9 CHEST PAIN: ICD-10-CM

## 2018-06-14 DIAGNOSIS — F41.9 ANXIETY: Primary | ICD-10-CM

## 2018-06-14 DIAGNOSIS — R07.89 NON-CARDIAC CHEST PAIN: ICD-10-CM

## 2018-06-14 LAB
B-HCG UR QL: NEGATIVE
CTP QC/QA: YES

## 2018-06-14 PROCEDURE — 99284 EMERGENCY DEPT VISIT MOD MDM: CPT | Mod: 25

## 2018-06-14 PROCEDURE — 93005 ELECTROCARDIOGRAM TRACING: CPT

## 2018-06-14 PROCEDURE — 81025 URINE PREGNANCY TEST: CPT | Performed by: PHYSICIAN ASSISTANT

## 2018-06-14 PROCEDURE — 25000003 PHARM REV CODE 250: Performed by: EMERGENCY MEDICINE

## 2018-06-14 PROCEDURE — 93010 ELECTROCARDIOGRAM REPORT: CPT | Mod: ,,, | Performed by: INTERNAL MEDICINE

## 2018-06-14 RX ORDER — IBUPROFEN 400 MG/1
400 TABLET ORAL EVERY 6 HOURS PRN
Qty: 20 TABLET | Refills: 0 | Status: SHIPPED | OUTPATIENT
Start: 2018-06-14 | End: 2018-10-05

## 2018-06-14 RX ORDER — LORAZEPAM 0.5 MG/1
0.5 TABLET ORAL
Status: DISCONTINUED | OUTPATIENT
Start: 2018-06-14 | End: 2018-06-14 | Stop reason: HOSPADM

## 2018-06-14 RX ORDER — LORAZEPAM 1 MG/1
0.5 TABLET ORAL EVERY 6 HOURS PRN
Qty: 10 TABLET | Refills: 0 | Status: SHIPPED | OUTPATIENT
Start: 2018-06-14 | End: 2018-10-19

## 2018-06-14 RX ORDER — IBUPROFEN 600 MG/1
600 TABLET ORAL
Status: COMPLETED | OUTPATIENT
Start: 2018-06-14 | End: 2018-06-14

## 2018-06-14 RX ADMIN — IBUPROFEN 600 MG: 600 TABLET ORAL at 01:06

## 2018-06-14 NOTE — ED TRIAGE NOTES
PT complaining of reproducable mid sternal chest pain that is sharp in nature when taking a deep breath or pushing on her chest.  No shortness of breath or any other symptoms.  States she has been feeling anxious all day and has a history of anxiety.

## 2018-06-14 NOTE — ED PROVIDER NOTES
"Encounter Date: 6/14/2018  SORT: This is a 28 y.o. female who presents for emergent consideration of chest pain and shortness of breath. Patient reports chest pain that began this evening while at work. She reports associated shortness of breath and feeling anxious.  Initial exam notable for NAD. Patient will be moved to a room when one is available; will wait in waiting room with triage nurse supervision. Orders placed.  DANIEL Magana, EDGAR     SCRIBE #1 NOTE: Guillermo COOPER, krystin scribing for, and in the presence of, Donovan Santos MD.       History     Chief Complaint   Patient presents with    Shortness of Breath     Pt c/o chest pain and SOB that started last night about 2100.  Denies N/V.  Denies taking any medication.  Reports hx of anxiety.      Chest Pain     CC:  Chest Pain     HPI: This 28 y.o F (LMP 6/8/18) smoker with anxiety and PMHx of leaky heart valve presents to the ED c/o acute onset of constant anxiety since yesterday AM. The pt also c/o acute onset of severe (8/10) "sharp" chest pain with deep breaths since 2000h yesterday. She denies previous episodes of similar chest pain. The pt is not Rx'ed anxiety medication. The pt works at night as a  at [id number]. The pt denies fever, chills, nausea, emesis, diaphoresis, abdominal pain, SOB, back pain, dysuria and hormone therapy. She denies illicit drug use. No prior tx.       The history is provided by the patient. No  was used.     Review of patient's allergies indicates:   Allergen Reactions    Hydroxyzine Swelling    Paroxetine Other (See Comments)     Lethargy     Past Medical History:   Diagnosis Date    Anxiety     Leaky heart valve      Past Surgical History:   Procedure Laterality Date    CARDIAC SURGERY       No family history on file.  Social History   Substance Use Topics    Smoking status: Current Every Day Smoker     Packs/day: 0.50     Types: Cigarettes    Smokeless tobacco: Never " Used    Alcohol use Yes      Comment: occassionally     Review of Systems   Constitutional: Negative for chills, diaphoresis and fever.   HENT: Negative for rhinorrhea and sore throat.    Eyes: Negative for redness.   Respiratory: Negative for cough and shortness of breath.    Cardiovascular: Positive for chest pain.   Gastrointestinal: Negative for abdominal pain, diarrhea, nausea and vomiting.   Genitourinary: Negative for dysuria, frequency and urgency.   Musculoskeletal: Negative for back pain and neck pain.   Skin: Negative for rash.   Psychiatric/Behavioral: The patient is nervous/anxious.        Physical Exam     Initial Vitals [06/14/18 0039]   BP Pulse Resp Temp SpO2   123/67 90 16 99.7 °F (37.6 °C) 100 %      MAP       --         Physical Exam    Nursing note and vitals reviewed.  Constitutional: She appears well-developed and well-nourished.  Non-toxic appearance. She does not appear ill.   HENT:   Head: Normocephalic and atraumatic.   Eyes: EOM are normal.   Neck: Neck supple.   Cardiovascular: Normal rate and regular rhythm.   Pulmonary/Chest: Effort normal and breath sounds normal. No respiratory distress.   Reproducible chest wall pain on palpation.   Abdominal: Soft. Normal appearance and bowel sounds are normal. She exhibits no distension. There is no tenderness.   Musculoskeletal: Normal range of motion.   Neurological: She is alert.   Skin: Skin is warm and dry.   Psychiatric: Her mood appears anxious.         ED Course   Procedures  Labs Reviewed   POCT URINE PREGNANCY     EKG Readings: (Independently Interpreted)   Initial Reading: No STEMI. Rhythm: Normal Sinus Rhythm. Heart Rate: 91 bpm. Clinical Impression: with RBBB   No change from last EKG 4/18/18       X-Ray Chest PA And Lateral   Final Result      No acute cardiopulmonary finding.         Electronically signed by: Michael Alejo MD   Date:    06/14/2018   Time:    01:44                      Scribe Attestation:   Scribe #1: I performed  the above scribed service and the documentation accurately describes the services I performed. I attest to the accuracy of the note.    Attending Attestation:           Physician Attestation for Scribe:  Physician Attestation Statement for Scribe #1: I, Donovan Santos MD, reviewed documentation, as scribed by Guillermo Rosales in my presence, and it is both accurate and complete.          Medical decision making:  Re-evaluation the patient with only intervention being 0.5 of Ativan taken p.o. and 400 of Motrin has resolution of her chest pain as well sensation of anxiety she states she feels much better patient's history and physical exam are consistent with anxiety and noncardiac chest pain she is discharged home with a prescription for Ativan 0.5 mg q.6 hours as needed for anxiety 10.  And Motrin 400 mg 20.           Clinical Impression:   The primary encounter diagnosis was Anxiety. Diagnoses of Chest pain and Non-cardiac chest pain were also pertinent to this visit.                             Jacques Man MD  06/14/18 0149

## 2018-07-09 ENCOUNTER — HOSPITAL ENCOUNTER (EMERGENCY)
Facility: HOSPITAL | Age: 28
Discharge: HOME | End: 2018-07-09
Attending: EMERGENCY MEDICINE
Payer: MEDICAID

## 2018-07-09 VITALS
HEIGHT: 64 IN | HEART RATE: 75 BPM | DIASTOLIC BLOOD PRESSURE: 53 MMHG | OXYGEN SATURATION: 100 % | TEMPERATURE: 98 F | WEIGHT: 136 LBS | SYSTOLIC BLOOD PRESSURE: 111 MMHG | BODY MASS INDEX: 23.22 KG/M2 | RESPIRATION RATE: 16 BRPM

## 2018-07-09 DIAGNOSIS — R51.9 ACUTE INTRACTABLE HEADACHE, UNSPECIFIED HEADACHE TYPE: ICD-10-CM

## 2018-07-09 DIAGNOSIS — R07.9 CHEST PAIN: ICD-10-CM

## 2018-07-09 DIAGNOSIS — F41.9 ANXIETY: Primary | ICD-10-CM

## 2018-07-09 LAB
B-HCG UR QL: NEGATIVE
CTP QC/QA: YES

## 2018-07-09 PROCEDURE — 99284 EMERGENCY DEPT VISIT MOD MDM: CPT | Mod: 25

## 2018-07-09 PROCEDURE — 81025 URINE PREGNANCY TEST: CPT | Performed by: NURSE PRACTITIONER

## 2018-07-09 PROCEDURE — 93010 ELECTROCARDIOGRAM REPORT: CPT | Mod: ,,, | Performed by: INTERNAL MEDICINE

## 2018-07-09 PROCEDURE — 93005 ELECTROCARDIOGRAM TRACING: CPT

## 2018-07-09 PROCEDURE — 25000003 PHARM REV CODE 250: Performed by: PHYSICIAN ASSISTANT

## 2018-07-09 RX ORDER — IBUPROFEN 400 MG/1
400 TABLET ORAL
Status: COMPLETED | OUTPATIENT
Start: 2018-07-09 | End: 2018-07-09

## 2018-07-09 RX ADMIN — IBUPROFEN 400 MG: 400 TABLET ORAL at 11:07

## 2018-07-10 NOTE — DISCHARGE INSTRUCTIONS
Please schedule a follow-up appointment with primary care to discuss your anxiety and consider a treatment plan.    Return to the ER for any concerns.

## 2018-07-10 NOTE — ED TRIAGE NOTES
Pt c/o chest pain that started 2 hours PTA. Pt states she was sitting down watching TV when it started. Describes pain as sharp, 9/10. Also c/o SOB. Denies cardiac Hx, pain radiation. Denies N/V, dizziness. Denies taking meds PTA

## 2018-07-10 NOTE — ED PROVIDER NOTES
Encounter Date: 7/9/2018  This is a SORT/MSE of a 28 y.o. female with anxiety presenting to the ED with c/o mid sternal chest pain x 2 hours.  She reports pain feels like her usual panic attacks.  She has not taken anything for the anxiety this evening.  She denies shortness of breath.  She is PERC negative.  Care will be transferred to an alternate provider when patient is roomed for a full evaluation and final disposition. Patient is aware that he/she is awaiting a room in the emergency department, where another provider will review results, evaluate and treat as needed. DAVID Rice, JENS  SCRIBE #1 NOTE: IArtem, am scribing for, and in the presence of,  Nadja Smiley PA-C. I have scribed the following portions of the note - Other sections scribed: HPI, ROS.       History     Chief Complaint   Patient presents with    Chest Pain     States she was having chest pains 2 hours ago after feeling anxious.  Hx of panic attacks and states she is on ativan, but it doesn't help her.  Used to take Xanax and it worked, but for some reason she was taken off the medication     CC: Chest Pain    HPI: This 28 y.o. Female with anxiety and leaky heart valve presents to the ED c/o sharp chest pain which began x2.5 hours PTA after feeling anxious. Pt reports trying to catch her breath after being anxious and her chest pain began. She admits ativan does not work but xanax does. Pt is out of xanax. She did not take any meds for her symptoms or chronic anxiety. Pt denies fever, chills, abdominal pain, back pain, rhinorrhea, nausea, diarrhea or emesis.      The history is provided by the patient. No  was used.     Review of patient's allergies indicates:   Allergen Reactions    Hydroxyzine Swelling    Paroxetine Other (See Comments)     Lethargy     Past Medical History:   Diagnosis Date    Anxiety     Heart valve problem     Leaky heart valve      Past Surgical History:   Procedure Laterality  Date    CARDIAC SURGERY       History reviewed. No pertinent family history.  Social History   Substance Use Topics    Smoking status: Current Every Day Smoker     Packs/day: 0.50     Types: Cigarettes    Smokeless tobacco: Never Used    Alcohol use Yes      Comment: occassionally     Review of Systems   Constitutional: Negative for chills, diaphoresis, fatigue and fever.   HENT: Negative for congestion, ear pain, rhinorrhea and sore throat.    Eyes: Negative for redness.   Respiratory: Negative for cough and shortness of breath.    Cardiovascular: Positive for chest pain (sharp).   Gastrointestinal: Negative for abdominal pain, diarrhea, nausea and vomiting.   Genitourinary: Negative for dysuria, hematuria, vaginal bleeding and vaginal discharge.   Musculoskeletal: Negative for back pain and neck pain.   Skin: Negative for rash.   Neurological: Negative for weakness, numbness and headaches.   Hematological: Does not bruise/bleed easily.   Psychiatric/Behavioral: Negative for confusion. The patient is nervous/anxious.        Physical Exam     Initial Vitals [07/09/18 2229]   BP Pulse Resp Temp SpO2   (!) 113/53 89 16 98.3 °F (36.8 °C) 100 %      MAP       --         Physical Exam    Nursing note and vitals reviewed.  Constitutional: Vital signs are normal. She appears well-developed and well-nourished. She is not diaphoretic. She is cooperative.  Non-toxic appearance. She does not have a sickly appearance. She does not appear ill. No distress.   HENT:   Head: Normocephalic and atraumatic.   Right Ear: Tympanic membrane, external ear and ear canal normal.   Left Ear: Tympanic membrane, external ear and ear canal normal.   Nose: Nose normal.   Mouth/Throat: Uvula is midline, oropharynx is clear and moist and mucous membranes are normal. No trismus in the jaw. No uvula swelling. No oropharyngeal exudate.   No tenderness to palpation of the temples bilaterally.    Eyes: Conjunctivae, EOM and lids are normal. Pupils  are equal, round, and reactive to light.   Neck: Trachea normal, normal range of motion, full passive range of motion without pain and phonation normal. Neck supple.   Cardiovascular: Normal rate, regular rhythm, normal heart sounds and intact distal pulses. Exam reveals no gallop and no friction rub.    No murmur heard.  Pulmonary/Chest: Effort normal and breath sounds normal. No respiratory distress. She has no decreased breath sounds. She has no wheezes. She has no rhonchi. She has no rales. She exhibits no mass, no tenderness, no bony tenderness, no laceration, no crepitus, no edema, no deformity, no swelling and no retraction.   Abdominal: Soft. Normal appearance and bowel sounds are normal. She exhibits no distension and no mass. There is no tenderness. There is no rigidity, no rebound and no guarding.   Musculoskeletal: Normal range of motion.   Neurological: She is alert and oriented to person, place, and time. She has normal strength. No cranial nerve deficit or sensory deficit.   Skin: Skin is warm and dry. Capillary refill takes less than 2 seconds. No rash noted.   Psychiatric: She has a normal mood and affect. Her speech is normal and behavior is normal. Judgment and thought content normal. Cognition and memory are normal.         ED Course   Procedures  Labs Reviewed   POCT URINE PREGNANCY          Imaging Results          X-Ray Chest PA And Lateral (Final result)  Result time 07/09/18 23:19:28    Final result by Darion Palacios MD (07/09/18 23:19:28)                 Impression:      No acute intrathoracic process identified.      Electronically signed by: Darion Palacios MD  Date:    07/09/2018  Time:    23:19             Narrative:    EXAMINATION:  XR CHEST PA AND LATERAL    CLINICAL HISTORY:  Chest pain, unspecified    TECHNIQUE:  PA and lateral views of the chest were performed.    COMPARISON:  06/14/2018.    FINDINGS:  Postsurgical sternotomy changes are seen.  Cardiac silhouette is normal in  "size.  Lungs are symmetrically expanded.  No evidence of focal consolidative process, pneumothorax, or significant effusion.  No acute osseous abnormality identified.                                       APC / Resident Notes:   This is an evaluation of a 28 y.o. Female with anxiety that presents to the Emergency Department for chest pain. Patient reports mid-sternal chest pain that "feels like her anxiety." Patient reports that she typically takes Xanax but she was taken off of this medication by her PCP. She reports that Ativan does not provide relief of her anxiety and she is allergic to Hydroxyzine. She also reports a frontal headache, but denies blurred vision, photophobia, phonophobia, confusion. She denies SOB, cough, fatigue, abdominal pain or further symptoms.     Physical Exam shows a non-toxic, afebrile, and well appearing female.  Normocephalic and atraumatic.  PERRLA.  EOMI. No tenderness to palpation of the temples bilaterally. No tenderness to palpation of the chest wall.  No evidence of injury or trauma.  Regular rate and rhythm, no murmurs, gallops or rubs.  Lungs clear to auscultation bilaterally, no wheezing, rales or rhonchi.  There is no evidence of acute respiratory distress.  Abdomen is soft and nontender.  Bowel sounds are appreciated. No peritoneal signs.    Vital Signs Are Reassuring. If available, previous records reviewed.   RESULTS:   EKG shows normal sinus rhythm. No STEMI or emergent arrhythmia.  Chest x-ray shows no acute intrathoracic process.  No focal consolidation, pneumothorax, pleural effusion, rib fracture.    My overall impression is chest discomfort, anxiety, headache.  DDx: chest pain, anxiety, headache  I do not suspect emergent process at this time.    ED Course: Patient declined Xanax in ED because she is unaccompanied and is unable to find a ride home. I feel this patient is stable for discharge. I will recommend follow-up with PCP tomorrow for further evaluation and " treatment of anxiety.  The diagnosis, treatment plan, instructions for follow-up and reevaluation with PCP, as well as ED return precautions were discussed and understanding was verbalized. All questions or concerns have been addressed. Patient was discharged home with an instructional sheet which gave not only information regarding the most likely diagnoses but also information regarding when to return to the emergency department for alarming symptoms and when to seek further care.      This case was discussed with Dr. Poon who is in agreement with my assessment and plan.     Nadja Smiley PA-C         Scribe Attestation:   Scribe #1: I performed the above scribed service and the documentation accurately describes the services I performed. I attest to the accuracy of the note.    Attending Attestation:           Physician Attestation for Scribe:  Physician Attestation Statement for Scribe #1: I, Nadja Smiley PA-C, reviewed documentation, as scribed by Artem Ramsey in my presence, and it is both accurate and complete.                    Clinical Impression:   The primary encounter diagnosis was Anxiety. Diagnoses of Chest pain and Acute intractable headache, unspecified headache type were also pertinent to this visit.      Disposition:   Disposition: Discharged  Condition: Stable                        Nadja Smiley PA-C  07/10/18 0150

## 2018-08-28 ENCOUNTER — HOSPITAL ENCOUNTER (EMERGENCY)
Facility: HOSPITAL | Age: 28
Discharge: HOME | End: 2018-08-29
Attending: EMERGENCY MEDICINE
Payer: MEDICAID

## 2018-08-28 DIAGNOSIS — R06.02 SOB (SHORTNESS OF BREATH): ICD-10-CM

## 2018-08-28 DIAGNOSIS — R82.71 ASYMPTOMATIC BACTERIURIA DURING PREGNANCY: ICD-10-CM

## 2018-08-28 DIAGNOSIS — Z34.90 PREGNANCY, UNSPECIFIED GESTATIONAL AGE: ICD-10-CM

## 2018-08-28 DIAGNOSIS — R10.9 ACUTE ABDOMINAL PAIN: Primary | ICD-10-CM

## 2018-08-28 DIAGNOSIS — O99.891 ASYMPTOMATIC BACTERIURIA DURING PREGNANCY: ICD-10-CM

## 2018-08-28 LAB
ALBUMIN SERPL BCP-MCNC: 4.1 G/DL
ALP SERPL-CCNC: 64 U/L
ALT SERPL W/O P-5'-P-CCNC: 10 U/L
AMYLASE SERPL-CCNC: 110 U/L
ANION GAP SERPL CALC-SCNC: 12 MMOL/L
AST SERPL-CCNC: 20 U/L
B-HCG UR QL: POSITIVE
BACTERIA #/AREA URNS HPF: ABNORMAL /HPF
BASOPHILS # BLD AUTO: 0.02 K/UL
BASOPHILS NFR BLD: 0.2 %
BILIRUB SERPL-MCNC: 0.3 MG/DL
BILIRUB UR QL STRIP: ABNORMAL
BUN SERPL-MCNC: 8 MG/DL
CALCIUM SERPL-MCNC: 9.4 MG/DL
CHLORIDE SERPL-SCNC: 108 MMOL/L
CK SERPL-CCNC: 85 U/L
CLARITY UR: ABNORMAL
CO2 SERPL-SCNC: 16 MMOL/L
COLOR UR: ABNORMAL
CREAT SERPL-MCNC: 0.9 MG/DL
CTP QC/QA: YES
DACRYOCYTES BLD QL SMEAR: ABNORMAL
DIFFERENTIAL METHOD: ABNORMAL
EOSINOPHIL # BLD AUTO: 0.3 K/UL
EOSINOPHIL NFR BLD: 3 %
ERYTHROCYTE [DISTWIDTH] IN BLOOD BY AUTOMATED COUNT: 20.5 %
EST. GFR  (AFRICAN AMERICAN): >60 ML/MIN/1.73 M^2
EST. GFR  (NON AFRICAN AMERICAN): >60 ML/MIN/1.73 M^2
GIANT PLATELETS BLD QL SMEAR: PRESENT
GLUCOSE SERPL-MCNC: 77 MG/DL
GLUCOSE UR QL STRIP: NEGATIVE
GRAN CASTS #/AREA URNS LPF: 1 /LPF
HCG INTACT+B SERPL-ACNC: 795 MIU/ML
HCT VFR BLD AUTO: 28.2 %
HGB BLD-MCNC: 8.6 G/DL
HGB UR QL STRIP: NEGATIVE
HYALINE CASTS #/AREA URNS LPF: 1 /LPF
HYPOCHROMIA BLD QL SMEAR: ABNORMAL
KETONES UR QL STRIP: ABNORMAL
LEUKOCYTE ESTERASE UR QL STRIP: ABNORMAL
LIPASE SERPL-CCNC: 25 U/L
LYMPHOCYTES # BLD AUTO: 3.3 K/UL
LYMPHOCYTES NFR BLD: 35.6 %
MCH RBC QN AUTO: 16.5 PG
MCHC RBC AUTO-ENTMCNC: 30.5 G/DL
MCV RBC AUTO: 54 FL
MICROSCOPIC COMMENT: ABNORMAL
MONOCYTES # BLD AUTO: 0.5 K/UL
MONOCYTES NFR BLD: 5.1 %
NEUTROPHILS # BLD AUTO: 5.2 K/UL
NEUTROPHILS NFR BLD: 56.2 %
NITRITE UR QL STRIP: NEGATIVE
OVALOCYTES BLD QL SMEAR: ABNORMAL
PH UR STRIP: 5 [PH] (ref 5–8)
PLATELET # BLD AUTO: 390 K/UL
PLATELET BLD QL SMEAR: ABNORMAL
PMV BLD AUTO: ABNORMAL FL
POTASSIUM SERPL-SCNC: 4.2 MMOL/L
PROT SERPL-MCNC: 7.8 G/DL
PROT UR QL STRIP: ABNORMAL
RBC # BLD AUTO: 5.2 M/UL
RBC #/AREA URNS HPF: 3 /HPF (ref 0–4)
SCHISTOCYTES BLD QL SMEAR: ABNORMAL
SODIUM SERPL-SCNC: 136 MMOL/L
SP GR UR STRIP: >1.03 (ref 1–1.03)
SQUAMOUS #/AREA URNS HPF: 15 /HPF
TARGETS BLD QL SMEAR: ABNORMAL
URN SPEC COLLECT METH UR: ABNORMAL
UROBILINOGEN UR STRIP-ACNC: ABNORMAL EU/DL
WBC # BLD AUTO: 9.19 K/UL
WBC #/AREA URNS HPF: 10 /HPF (ref 0–5)

## 2018-08-28 PROCEDURE — 85025 COMPLETE CBC W/AUTO DIFF WBC: CPT

## 2018-08-28 PROCEDURE — 81000 URINALYSIS NONAUTO W/SCOPE: CPT

## 2018-08-28 PROCEDURE — 93005 ELECTROCARDIOGRAM TRACING: CPT

## 2018-08-28 PROCEDURE — 25000003 PHARM REV CODE 250: Performed by: EMERGENCY MEDICINE

## 2018-08-28 PROCEDURE — 83690 ASSAY OF LIPASE: CPT

## 2018-08-28 PROCEDURE — 81025 URINE PREGNANCY TEST: CPT | Performed by: NURSE PRACTITIONER

## 2018-08-28 PROCEDURE — 84702 CHORIONIC GONADOTROPIN TEST: CPT

## 2018-08-28 PROCEDURE — 87491 CHLMYD TRACH DNA AMP PROBE: CPT

## 2018-08-28 PROCEDURE — 82550 ASSAY OF CK (CPK): CPT

## 2018-08-28 PROCEDURE — 82150 ASSAY OF AMYLASE: CPT

## 2018-08-28 PROCEDURE — 93010 ELECTROCARDIOGRAM REPORT: CPT | Mod: ,,, | Performed by: INTERNAL MEDICINE

## 2018-08-28 PROCEDURE — 80053 COMPREHEN METABOLIC PANEL: CPT

## 2018-08-28 PROCEDURE — 99284 EMERGENCY DEPT VISIT MOD MDM: CPT

## 2018-08-28 RX ORDER — ACETAMINOPHEN 325 MG/1
650 TABLET ORAL
Status: COMPLETED | OUTPATIENT
Start: 2018-08-28 | End: 2018-08-28

## 2018-08-28 RX ORDER — ALPRAZOLAM 0.25 MG/1
0.25 TABLET ORAL
Status: DISCONTINUED | OUTPATIENT
Start: 2018-08-28 | End: 2018-08-28

## 2018-08-28 RX ADMIN — ACETAMINOPHEN 650 MG: 325 TABLET, FILM COATED ORAL at 10:08

## 2018-08-29 VITALS
HEIGHT: 63 IN | BODY MASS INDEX: 25.69 KG/M2 | HEART RATE: 88 BPM | WEIGHT: 145 LBS | DIASTOLIC BLOOD PRESSURE: 77 MMHG | RESPIRATION RATE: 17 BRPM | OXYGEN SATURATION: 99 % | TEMPERATURE: 98 F | SYSTOLIC BLOOD PRESSURE: 129 MMHG

## 2018-08-29 LAB
BACTERIA GENITAL QL WET PREP: ABNORMAL
C TRACH DNA SPEC QL NAA+PROBE: NOT DETECTED
CLUE CELLS VAG QL WET PREP: ABNORMAL
FILAMENT FUNGI VAG WET PREP-#/AREA: ABNORMAL
N GONORRHOEA DNA SPEC QL NAA+PROBE: NOT DETECTED
SPECIMEN SOURCE: ABNORMAL
T VAGINALIS GENITAL QL WET PREP: ABNORMAL
WBC #/AREA VAG WET PREP: ABNORMAL
YEAST GENITAL QL WET PREP: ABNORMAL

## 2018-08-29 PROCEDURE — 87210 SMEAR WET MOUNT SALINE/INK: CPT

## 2018-08-29 RX ORDER — NITROFURANTOIN 25; 75 MG/1; MG/1
100 CAPSULE ORAL EVERY 12 HOURS
Qty: 14 CAPSULE | Refills: 0 | Status: SHIPPED | OUTPATIENT
Start: 2018-08-29 | End: 2018-08-29 | Stop reason: SDUPTHER

## 2018-08-29 RX ORDER — NITROFURANTOIN 25; 75 MG/1; MG/1
100 CAPSULE ORAL EVERY 12 HOURS
Qty: 14 CAPSULE | Refills: 0 | Status: SHIPPED | OUTPATIENT
Start: 2018-08-29 | End: 2018-09-05

## 2018-08-29 NOTE — ED PROVIDER NOTES
"Encounter Date: 8/28/2018  SORT MSE:  Pt is a 28 y.o. female who presents for emergent consideration for sob, abd pain. Pt will be moved to room when one is available, otherwise will wait in waiting room with triage nurse supervision.  Pt arrived by ambulatory. She is not in distress. Orders have been placed. KISHORE Chaudhry DNP ACNP-BC 8/28/2018 9:15 PM     SCRIBE #1 NOTE: I, Nika Elena, am scribing for, and in the presence of,  Stefan Merlos MD. I have scribed the following portions of the note - Other sections scribed: HPI/ROS/PE.       History     Chief Complaint   Patient presents with    Shortness of Breath     started this morning. Pt denies chest pain. States "I think it's because of my anxiety    Abdominal Pain     lower quadrant x 1 day. Pt denies NVD     CC: Shortness of Breath    HPI: This 28 y.o female who has Anxiety presents to the ED for an evaluation of acute onset, constant shortness of breath for the past 2 hours.  Patient reports her shortness of breath began while lying down and states she has been having difficulty taking deep breaths.  She states she thinks her shortness of breath is a result of an anxiety attack.  She reports in the past, she treated her anxiety attacks with Xanax PRN.  She reports her last anxiety attack mid July.  Patient also reports of left lower abdominal pain that began this morning described as cramping.  She reports her abdominal pain is currently moderate in severity.   She reports her last menstrual cycle at the beginning of July and reports a possibility of pregnancy.  She reports of 2 prior pregnancies, both resulting in live births.  Patient denies fever, chills, chest pain, back pain, palpitations, sore throat, dizziness, suicidal ideation, homicidal ideation, visual/auditory hallucinations, dysuria, or any other associated symptoms.  No prior tx.  No alleviating factors.      The history is provided by the patient. No  was used.     Review " of patient's allergies indicates:   Allergen Reactions    Hydroxyzine Swelling    Paroxetine Other (See Comments)     Lethargy     Past Medical History:   Diagnosis Date    Anxiety     Heart valve problem     Leaky heart valve      Past Surgical History:   Procedure Laterality Date    CARDIAC SURGERY       History reviewed. No pertinent family history.  Social History     Tobacco Use    Smoking status: Current Every Day Smoker     Packs/day: 0.50     Types: Cigarettes    Smokeless tobacco: Never Used   Substance Use Topics    Alcohol use: Yes     Comment: occassionally    Drug use: No     Review of Systems   Constitutional: Negative for chills and fever.   HENT: Negative for sore throat and trouble swallowing.    Eyes: Negative for visual disturbance.   Respiratory: Positive for shortness of breath. Negative for cough.    Cardiovascular: Negative for chest pain.   Gastrointestinal: Positive for abdominal pain. Negative for diarrhea, nausea and vomiting.   Endocrine: Negative for polydipsia and polyuria.   Genitourinary: Negative for dysuria, frequency and hematuria.   Musculoskeletal: Negative for arthralgias, back pain and myalgias.        (-) arm or leg problems   Skin: Negative for rash.   Neurological: Negative for weakness, numbness and headaches.   Psychiatric/Behavioral: Positive for agitation. Negative for hallucinations and suicidal ideas. The patient is not nervous/anxious.         (-) homicidal ideation       Physical Exam     Initial Vitals [08/28/18 2115]   BP Pulse Resp Temp SpO2   119/61 95 16 98 °F (36.7 °C) 100 %      MAP       --         Physical Exam    Nursing note and vitals reviewed.  Constitutional: She appears well-developed and well-nourished. She is not diaphoretic. No distress.   HENT:   Head: Normocephalic and atraumatic.   Mouth/Throat: Oropharynx is clear and moist.   Eyes: Conjunctivae are normal. No scleral icterus.   Cardiovascular: Normal rate and regular rhythm. Exam  reveals no gallop and no friction rub.    No murmur heard.  Pulses:       Radial pulses are 2+ on the right side, and 2+ on the left side.        Dorsalis pedis pulses are 2+ on the right side, and 2+ on the left side.   No peripheral edema.   Pulmonary/Chest: Breath sounds normal. No respiratory distress. She has no wheezes. She has no rhonchi. She has no rales. She exhibits no tenderness.   Abdominal: Soft. Normal appearance and bowel sounds are normal. She exhibits no distension. There is tenderness (mild) in the right lower quadrant and left lower quadrant. There is no rebound and no guarding.   Genitourinary: Vagina normal. There is no rash, tenderness, lesion or injury on the right labia. There is no rash, tenderness, lesion or injury on the left labia. Uterus is enlarged (palpated FPC between pubic bone and umbilicus). Uterus is not tender. Cervix exhibits no motion tenderness, no discharge and no friability. Right adnexum displays no mass, no tenderness and no fullness. Left adnexum displays no mass, no tenderness and no fullness.   Musculoskeletal: Normal range of motion. She exhibits no edema.   Neurological: She is alert and oriented to person, place, and time. She has normal strength. No cranial nerve deficit or sensory deficit. GCS eye subscore is 4. GCS verbal subscore is 5. GCS motor subscore is 6.   Skin: Skin is warm and dry. No pallor.   Psychiatric: Her speech is normal and behavior is normal. Judgment and thought content normal. Her mood appears anxious. Her affect is not angry, not labile and not inappropriate. She is not actively hallucinating. Cognition and memory are normal. She does not exhibit a depressed mood. She is attentive.         ED Course   Procedures  Labs Reviewed   CBC W/ AUTO DIFFERENTIAL - Abnormal; Notable for the following components:       Result Value    Hemoglobin 8.6 (*)     Hematocrit 28.2 (*)     MCV 54 (*)     MCH 16.5 (*)     MCHC 30.5 (*)     RDW 20.5 (*)      Platelets 390 (*)     All other components within normal limits    Narrative:     Recoll. [id number] by APARNA at 08/28/2018 22:07, reason: Insufficient   specimen  PLEASE COLLECT EXTRA GREEN TOP   COMPREHENSIVE METABOLIC PANEL - Abnormal; Notable for the following components:    CO2 16 (*)     All other components within normal limits   URINALYSIS, REFLEX TO URINE CULTURE - Abnormal; Notable for the following components:    Appearance, UA Hazy (*)     Specific Gravity, UA >1.030 (*)     Protein, UA 1+ (*)     Ketones, UA Trace (*)     Bilirubin (UA) 1+ (*)     Urobilinogen, UA 4.0-6.0 (*)     Leukocytes, UA 3+ (*)     All other components within normal limits   VAGINAL SCREEN - Abnormal; Notable for the following components:    Budding Yeast Occasional (*)     Fungal Hyphae Occasional (*)     WBC - Vaginal Screen Rare (*)     Bacteria - Vaginal Screen Rare (*)     All other components within normal limits   URINALYSIS MICROSCOPIC - Abnormal; Notable for the following components:    WBC, UA 10 (*)     Granular Casts, UA 1 (*)     All other components within normal limits   POCT URINE PREGNANCY - Abnormal; Notable for the following components:    POC Preg Test, Ur Positive (*)     All other components within normal limits   C. TRACHOMATIS/N. GONORRHOEAE BY AMP DNA   AMYLASE   LIPASE   CK   HCG, QUANTITATIVE, PREGNANCY     EKG Readings: (Independently Interpreted)   Rhythm: Normal Sinus Rhythm. Heart Rate: 90. Ectopy: No Ectopy. Conduction: RBBB. ST Segments: Normal ST Segments. Axis: Normal. Other Impression: T-wave flattening and inversion in leads I and aVL.       Imaging Results          US OB Less Than 14 Wks with Transvaginal (xpd) (Final result)  Result time 08/28/18 23:49:26    Final result by Darion Palacios MD (08/28/18 23:49:26)                 Impression:      1. No  intrauterine pregnancy or gestational sac visualized, technically pregnancy of unknown location.  Findings may relate to early pregnancy or  spontaneous .  No definite adnexal abnormalities or significant free fluid seen to suggest ectopic pregnancy.  Recommend serial beta hCGs and repeat pelvic ultrasound as clinically warranted.  2. Left ovarian complex cystic lesion, possible hemorrhagic cyst.  No definite adnexal abnormality seen to otherwise suggest ectopic pregnancy.  3. Nonspecific small cystic structure seen to the right of the cervix.  This is of uncertain etiology, although appearance is not suggestive for gestational sac.  4. Small uterine fibroid.      Electronically signed by: Darion Palacios MD  Date:    2018  Time:    23:49             Narrative:    EXAMINATION:  US OB LESS THAN 14 WKS WITH TRANSVAGINAL (XPD)    CLINICAL HISTORY:  abdominal pain;    TECHNIQUE:  Transabdominal sonography of the pelvis was performed, followed by transvaginal sonography to better evaluate the uterus and ovaries.    COMPARISON:  None.    FINDINGS:  The uterus measures 9.3 x 5.6 x 6.5 cm. Uterine parenchyma is heterogenous in echotexture with single fibroid seen measuring 2.7 x 2.4 x 2.7 cm.  No definite intrauterine pregnancy or gestational sac seen.  There is mild thickening of the endometrium with small amount of endometrial fluid seen.  Nonspecific small cystic structure seen to the right of the cervix of unclear etiology which measures 1.0 cm.    The right ovary measures 1.6 x 1.1 x 1.5 cm. The left ovary measures 3.9 x 3.5 x 3.3 cm. Arterial and venous flow are preserved bilaterally.  Complex left ovarian cystic lesion is visualized measuring 2.8 x 2.4 x 2.6 cm which demonstrates mild surrounding vascularity.  Otherwise no adnexal abnormalities seen.  No significant free fluid is seen.                               X-Ray Chest AP Portable (Final result)  Result time 18 22:08:28   Procedure changed from X-Ray Chest PA And Lateral     Final result by Darion Palacios MD (18 22:08:28)                 Impression:      No acute  intrathoracic process identified.      Electronically signed by: Darion Palacios MD  Date:    08/28/2018  Time:    22:08             Narrative:    EXAMINATION:  XR CHEST AP PORTABLE    CLINICAL HISTORY:  SOB; Shortness of breath    TECHNIQUE:  Single frontal view of the chest was performed.    COMPARISON:  07/09/2018.    FINDINGS:  Cardiac silhouette is normal in size.  Postsurgical sternotomy wires are seen.  Lungs are symmetrically expanded.  No evidence of focal consolidative process, pneumothorax, or significant effusion.  No acute osseous abnormality identified.                                 Medical Decision Making:   Independently Interpreted Test(s):   I have ordered and independently interpreted EKG Reading(s) - see prior notes  Clinical Tests:   Lab Tests: Ordered and Reviewed  Radiological Study: Ordered and Reviewed  Medical Tests: Ordered and Reviewed  ED Management:  8/29/18 00:20 - SOB resolved. Abdominal pain improved with Tylenol.  Medical decision making:  This patient was evaluated for acute onset of shortness of breath.  She is also had persistent lower abdominal discomfort throughout the day.  She is afebrile with stable vital signs. She has no clinical signs of dehydration.  She is in no respiratory distress.  She has clear breath sounds on auscultation of the lungs.  She does have mild tenderness across her lower abdomen which is soft and nondistended.  She has normal pelvic examination. She has no leukocytosis, renal insufficiency, or electrolyte anomalies.  She was incidentally found to be pregnant here in the emergency department [facility].  Her quantitative beta HCG is less than 800.  Pelvic ultrasound shows no intrauterine gestation or concerning findings for ectopic pregnancy.  She does have asymptomatic bacteriuria which will be treated with a course of Macrobid.  Her abdominal pain improved significantly with Tylenol.  She is stable for discharge at this time.  She has been instructed to  contact her obstetrician later today to arrange follow-up in a couple days with her HCG can be recheck.  She has been instructed to return to the ED immediately should she have worsened or severe abdominal pain, intractable nausea/vomiting, syncope/near-syncope, or for any other concerns.            Scribe Attestation:   Scribe #1: I performed the above scribed service and the documentation accurately describes the services I performed. I attest to the accuracy of the note.    Attending Attestation:           Physician Attestation for Scribe:  Physician Attestation Statement for Scribe #1: I, Stefan Merlos MD, reviewed documentation, as scribed by Nika Elena in my presence, and it is both accurate and complete.                 ED Course as of Aug 29 0057   Wed Aug 29, 2018   0044 hCG Quant: 795 [LP]      ED Course User Index  [LP] Stefan Merlos III, MD     Clinical Impression:   The primary encounter diagnosis was Acute abdominal pain. Diagnoses of SOB (shortness of breath), Pregnancy, unspecified gestational age, and Asymptomatic bacteriuria during pregnancy were also pertinent to this visit.      Disposition:   Disposition: Discharged  Condition: Stable                        Stefan Merlos III, MD  08/29/18 0100       Stefan Merlos III, MD  08/29/18 0146

## 2018-09-26 ENCOUNTER — HOSPITAL ENCOUNTER (EMERGENCY)
Facility: HOSPITAL | Age: 28
Discharge: HOME | End: 2018-09-26
Attending: EMERGENCY MEDICINE
Payer: MEDICAID

## 2018-09-26 VITALS
BODY MASS INDEX: 23.05 KG/M2 | TEMPERATURE: 98 F | HEIGHT: 64 IN | OXYGEN SATURATION: 100 % | WEIGHT: 135 LBS | HEART RATE: 75 BPM | DIASTOLIC BLOOD PRESSURE: 61 MMHG | RESPIRATION RATE: 16 BRPM | SYSTOLIC BLOOD PRESSURE: 118 MMHG

## 2018-09-26 DIAGNOSIS — O23.11 CYSTITIS OF PREGNANCY IN FIRST TRIMESTER: Primary | ICD-10-CM

## 2018-09-26 DIAGNOSIS — R10.30 LOWER ABDOMINAL PAIN: ICD-10-CM

## 2018-09-26 LAB
ALBUMIN SERPL BCP-MCNC: 3.4 G/DL
ALP SERPL-CCNC: 51 U/L
ALT SERPL W/O P-5'-P-CCNC: 7 U/L
AMORPH CRY URNS QL MICRO: ABNORMAL
ANION GAP SERPL CALC-SCNC: 6 MMOL/L
AST SERPL-CCNC: 13 U/L
B-HCG UR QL: POSITIVE
BACTERIA #/AREA URNS HPF: ABNORMAL /HPF
BACTERIA GENITAL QL WET PREP: ABNORMAL
BASOPHILS # BLD AUTO: 0.01 K/UL
BASOPHILS NFR BLD: 0.1 %
BILIRUB SERPL-MCNC: 0.2 MG/DL
BILIRUB UR QL STRIP: NEGATIVE
BUN SERPL-MCNC: 7 MG/DL
CALCIUM SERPL-MCNC: 9.5 MG/DL
CHLORIDE SERPL-SCNC: 107 MMOL/L
CLARITY UR: ABNORMAL
CLUE CELLS VAG QL WET PREP: ABNORMAL
CO2 SERPL-SCNC: 20 MMOL/L
COLOR UR: YELLOW
CREAT SERPL-MCNC: 0.7 MG/DL
CTP QC/QA: YES
DIFFERENTIAL METHOD: ABNORMAL
EOSINOPHIL # BLD AUTO: 0.1 K/UL
EOSINOPHIL NFR BLD: 1 %
ERYTHROCYTE [DISTWIDTH] IN BLOOD BY AUTOMATED COUNT: 20.1 %
EST. GFR  (AFRICAN AMERICAN): >60 ML/MIN/1.73 M^2
EST. GFR  (NON AFRICAN AMERICAN): >60 ML/MIN/1.73 M^2
FILAMENT FUNGI VAG WET PREP-#/AREA: ABNORMAL
GLUCOSE SERPL-MCNC: 74 MG/DL
GLUCOSE UR QL STRIP: NEGATIVE
HCG INTACT+B SERPL-ACNC: NORMAL MIU/ML
HCT VFR BLD AUTO: 28.6 %
HGB BLD-MCNC: 8.8 G/DL
HGB UR QL STRIP: NEGATIVE
KETONES UR QL STRIP: NEGATIVE
LACTATE SERPL-SCNC: 1.8 MMOL/L
LEUKOCYTE ESTERASE UR QL STRIP: ABNORMAL
LYMPHOCYTES # BLD AUTO: 2.2 K/UL
LYMPHOCYTES NFR BLD: 27.3 %
MCH RBC QN AUTO: 16.8 PG
MCHC RBC AUTO-ENTMCNC: 30.8 G/DL
MCV RBC AUTO: 55 FL
MICROSCOPIC COMMENT: ABNORMAL
MONOCYTES # BLD AUTO: 0.7 K/UL
MONOCYTES NFR BLD: 8.4 %
NEUTROPHILS # BLD AUTO: 5.1 K/UL
NEUTROPHILS NFR BLD: 63.3 %
NITRITE UR QL STRIP: NEGATIVE
PH UR STRIP: 7 [PH] (ref 5–8)
PLATELET # BLD AUTO: 474 K/UL
PMV BLD AUTO: ABNORMAL FL
POIKILOCYTOSIS BLD QL SMEAR: SLIGHT
POTASSIUM SERPL-SCNC: 3.8 MMOL/L
PROT SERPL-MCNC: 7.3 G/DL
PROT UR QL STRIP: NEGATIVE
RBC # BLD AUTO: 5.24 M/UL
SODIUM SERPL-SCNC: 133 MMOL/L
SP GR UR STRIP: 1.02 (ref 1–1.03)
SPECIMEN SOURCE: ABNORMAL
SQUAMOUS #/AREA URNS HPF: 5 /HPF
T VAGINALIS GENITAL QL WET PREP: ABNORMAL
URN SPEC COLLECT METH UR: ABNORMAL
UROBILINOGEN UR STRIP-ACNC: NEGATIVE EU/DL
WBC # BLD AUTO: 8.05 K/UL
WBC #/AREA URNS HPF: 7 /HPF (ref 0–5)
WBC #/AREA VAG WET PREP: ABNORMAL
YEAST GENITAL QL WET PREP: ABNORMAL

## 2018-09-26 PROCEDURE — 99284 EMERGENCY DEPT VISIT MOD MDM: CPT | Mod: 25

## 2018-09-26 PROCEDURE — 81025 URINE PREGNANCY TEST: CPT | Performed by: PHYSICIAN ASSISTANT

## 2018-09-26 PROCEDURE — 25000003 PHARM REV CODE 250: Performed by: PHYSICIAN ASSISTANT

## 2018-09-26 PROCEDURE — 83605 ASSAY OF LACTIC ACID: CPT

## 2018-09-26 PROCEDURE — 87491 CHLMYD TRACH DNA AMP PROBE: CPT

## 2018-09-26 PROCEDURE — 96374 THER/PROPH/DIAG INJ IV PUSH: CPT

## 2018-09-26 PROCEDURE — 85025 COMPLETE CBC W/AUTO DIFF WBC: CPT

## 2018-09-26 PROCEDURE — 87210 SMEAR WET MOUNT SALINE/INK: CPT

## 2018-09-26 PROCEDURE — 80053 COMPREHEN METABOLIC PANEL: CPT

## 2018-09-26 PROCEDURE — 63600175 PHARM REV CODE 636 W HCPCS: Performed by: PHYSICIAN ASSISTANT

## 2018-09-26 PROCEDURE — 84702 CHORIONIC GONADOTROPIN TEST: CPT

## 2018-09-26 PROCEDURE — 96361 HYDRATE IV INFUSION ADD-ON: CPT

## 2018-09-26 PROCEDURE — 81000 URINALYSIS NONAUTO W/SCOPE: CPT

## 2018-09-26 RX ORDER — CEPHALEXIN 500 MG/1
500 CAPSULE ORAL 3 TIMES DAILY
Qty: 21 CAPSULE | Refills: 0 | Status: SHIPPED | OUTPATIENT
Start: 2018-09-26 | End: 2018-10-03

## 2018-09-26 RX ORDER — ONDANSETRON 2 MG/ML
4 INJECTION INTRAMUSCULAR; INTRAVENOUS
Status: COMPLETED | OUTPATIENT
Start: 2018-09-26 | End: 2018-09-26

## 2018-09-26 RX ORDER — CEPHALEXIN 500 MG/1
500 CAPSULE ORAL
Status: COMPLETED | OUTPATIENT
Start: 2018-09-26 | End: 2018-09-26

## 2018-09-26 RX ORDER — ACETAMINOPHEN 500 MG
1000 TABLET ORAL
Status: COMPLETED | OUTPATIENT
Start: 2018-09-26 | End: 2018-09-26

## 2018-09-26 RX ADMIN — CEPHALEXIN 500 MG: 500 CAPSULE ORAL at 07:09

## 2018-09-26 RX ADMIN — SODIUM CHLORIDE 1000 ML: 0.9 INJECTION, SOLUTION INTRAVENOUS at 06:09

## 2018-09-26 RX ADMIN — ACETAMINOPHEN 1000 MG: 500 TABLET, FILM COATED ORAL at 04:09

## 2018-09-26 RX ADMIN — ONDANSETRON HYDROCHLORIDE 4 MG: 2 INJECTION INTRAMUSCULAR; INTRAVENOUS at 04:09

## 2018-09-26 NOTE — ED PROVIDER NOTES
Encounter Date: 9/26/2018  SORT: This is a 28 y.o. female who presents for emergent consideration of RLQ abdominal pain with nausea and emesis x 4 today. Patient will be moved to a room when one is available. Orders placed.  DANIEL Magana, EDGAR        History     Chief Complaint   Patient presents with    Abdominal Pain     RLQ abdominal pain with vomiting x 1 day.     27yo F with pmh anxiety, hx valvulopathy s/p open heart surgery as an infant, presents to ED complaining of RLQ abdominal pain, n/v since yesterday morning. Pt admits to constant, cramping RLQ abdominal pain, waxing and waning in character. Severity 8/10. No alleviating, exacerbating factors. Denies radiation of pain. Denies trauma. No urinary complaints, no flank pain. No change in bowel habits. She admits to nausea with 4 episodes nonbloody, nonbilious emesis yesterday; 2 episodes today. Denies upper abdominal pain. Denies fever. Decreased appetite, PO intake since yesterday.      Incidental pregnancy finding on recent visit to ED 8/28/18; at that time with LLQ abdominal pain. U/S with pregnancy of unknown location during visit. HCG <800. Discharged on Macrobid due to asymptomatic bacteriuria. She did complete course. She also followed up with OB. Denies repeat U/S. Does not know if repeat hcg. LMP end of July.          Review of patient's allergies indicates:   Allergen Reactions    Hydroxyzine Swelling    Paroxetine Other (See Comments)     Lethargy     Past Medical History:   Diagnosis Date    Anxiety     Heart valve problem     Leaky heart valve      Past Surgical History:   Procedure Laterality Date    CARDIAC SURGERY       History reviewed. No pertinent family history.  Social History     Tobacco Use    Smoking status: Current Every Day Smoker     Packs/day: 0.50     Types: Cigarettes    Smokeless tobacco: Never Used   Substance Use Topics    Alcohol use: Yes     Comment: occassionally    Drug use: No     Review of Systems    Constitutional: Positive for appetite change (decreased). Negative for chills and fever.   HENT: Negative for sore throat.    Eyes: Negative.    Respiratory: Negative for shortness of breath.    Cardiovascular: Negative for chest pain.   Gastrointestinal: Positive for abdominal pain, nausea and vomiting. Negative for abdominal distention, anal bleeding, blood in stool, constipation, diarrhea and rectal pain.   Endocrine: Negative.    Genitourinary: Negative for difficulty urinating, dysuria, flank pain, hematuria, pelvic pain, vaginal bleeding, vaginal discharge and vaginal pain.   Musculoskeletal: Negative for back pain, myalgias, neck pain and neck stiffness.   Skin: Negative for rash.   Neurological: Negative for weakness and headaches.   Hematological: Does not bruise/bleed easily.   Psychiatric/Behavioral: Negative.    All other systems reviewed and are negative.      Physical Exam     Initial Vitals [09/26/18 1525]   BP Pulse Resp Temp SpO2   130/62 103 16 98.1 °F (36.7 °C) 100 %      MAP       --         Physical Exam    Nursing note and vitals reviewed.  Constitutional: She appears well-developed and well-nourished. She is not diaphoretic. No distress.   Overall well-appearing, nontoxic.  Uncomfortable.   HENT:   Head: Normocephalic and atraumatic.   Eyes: Conjunctivae and EOM are normal. Pupils are equal, round, and reactive to light.   Neck: Normal range of motion. Neck supple. No tracheal deviation present.   Cardiovascular: Intact distal pulses.   Pulmonary/Chest: Breath sounds normal. No stridor. No respiratory distress. She has no wheezes.   Abdominal:   Abdomen overall soft, normal bowel sounds ×4.  TTP RLQ, suprapubic region.  No rebound or guarding.  No palpable mass or distention.  No flank or CVA tenderness.  Negative Bustamante sign. There is pain over McBurney's point. Negative psoas, negative rovsing's.    Genitourinary:   Genitourinary Comments: No vesicular lesions or open sores to external  genitalia. Scant curd-like discharge within vault. No gross bleeding, tissue within vaginal vault. No adnexal mass or fullness, however R adnexal ttp. Cervix os closed. No cervix erythema/friability.+CMT. Scant mucoid d/c from os. No bleeding or tissue from os.   Musculoskeletal: Normal range of motion. She exhibits no tenderness.   Lymphadenopathy:     She has no cervical adenopathy.   Neurological: She is alert and oriented to person, place, and time.   Skin: Skin is warm and dry. Capillary refill takes less than 2 seconds.   Psychiatric: She has a normal mood and affect. Her behavior is normal. Judgment and thought content normal.         ED Course   Procedures  Labs Reviewed   URINALYSIS, REFLEX TO URINE CULTURE - Abnormal; Notable for the following components:       Result Value    Appearance, UA Cloudy (*)     Leukocytes, UA 2+ (*)     All other components within normal limits    Narrative:     Preferred Collection Type->Urine, Clean Catch   CBC W/ AUTO DIFFERENTIAL - Abnormal; Notable for the following components:    Hemoglobin 8.8 (*)     Hematocrit 28.6 (*)     MCV 55 (*)     MCH 16.8 (*)     MCHC 30.8 (*)     RDW 20.1 (*)     Platelets 474 (*)     All other components within normal limits    Narrative:     Recoll. [id number] by STALIN at 09/26/2018 16:37, reason: SPECIMEN   CLOTTED; VELIA Rubio 09/26/2018  16:37   COMPREHENSIVE METABOLIC PANEL - Abnormal; Notable for the following components:    Sodium 133 (*)     CO2 20 (*)     Albumin 3.4 (*)     Alkaline Phosphatase 51 (*)     ALT 7 (*)     Anion Gap 6 (*)     All other components within normal limits   VAGINAL SCREEN - Abnormal; Notable for the following components:    WBC - Vaginal Screen Few (*)     Bacteria - Vaginal Screen Moderate (*)     All other components within normal limits   URINALYSIS MICROSCOPIC - Abnormal; Notable for the following components:    WBC, UA 7 (*)     Bacteria, UA Few (*)     All other components within normal limits     Narrative:     Preferred Collection Type->Urine, Clean Catch   POCT URINE PREGNANCY - Abnormal; Notable for the following components:    POC Preg Test, Ur Positive (*)     All other components within normal limits   C. TRACHOMATIS/N. GONORRHOEAE BY AMP DNA   HCG, QUANTITATIVE, PREGNANCY   LACTIC ACID, PLASMA          Imaging Results          US OB Less Than 14 Wks with Transvaginal (xpd) (Final result)  Result time 09/26/18 18:01:40    Final result by Jani Holland MD (09/26/18 18:01:40)                 Impression:      Single live intrauterine pregnancy with estimated gestational age 9 weeks 2 days and an LISS of 04/29/2019.    Tiny subchorionic hematoma without significant mass effect.    Borderline fetal tachycardia at 170 BPM.  Follow-up as warranted.    Slightly larger but more hypoechoic left ovarian complex cysts, probably representing a hemorrhagic cyst now with less clot.  Follow-up pelvic ultrasound in 6-8 weeks is recommended to ensure stability/resolution.    Solitary uterine fibroid.      Electronically signed by: Jani Holland MD  Date:    09/26/2018  Time:    18:01             Narrative:    EXAMINATION:  US OB LESS THAN 14 WKS WITH TRANSVAGINAL (XPD)    CLINICAL HISTORY:  RLQ abdominal pain, hx cystic structure on previous U/S--pregnancy unknown location at that time;    TECHNIQUE:  Transabdominal sonography of the pelvis was performed, followed by transvaginal sonography to better evaluate the uterus and ovaries.    COMPARISON:  Pelvic ultrasound 08/28/2018    FINDINGS:  Uterus measures 10.8 x 6.6 x 8.1 cm.  There is a 2.7 cm fibroid again noted.  Small nabothian cyst noted.    Intrauterine gestation(s): Single    Mean gestational sac diameter: 4.3 cm    Yolk sac: 3 mm    Crown-rump length (CRL): 2 cm    Cardiac activity: 170 bpm, borderline tachycardic.    Subchorionic hemorrhage: Suspected 1.2 x 0.2 x 1.2 cm without significant mass effect.    Right ovary: Not visualized.    Left ovary: 5.6 x 4 x  3.5 cm with intact arterial and venous flow containing a 3.2 x 2.6 x 3.2 cm nonvascular hypoechoic structure which is slightly larger but overall more hypoechoic with less septations when compared to previous study.  This is suggestive of a hemorrhagic cyst.    Miscellaneous: No Free Fluid.                                 Medical Decision Making:   Differential Diagnosis:   Cervicitis, PID, UTI, STI, nephrolithiasis, pyelonephritis, appencitis, abdominal pain in pregnancy  ED Management:  29yo pregnant female with RLQ abdominal pain x 2 days. No fever. Multiple episodes n/v. No change in bowel habits. Overall well-appearing, however ttp mcburney's point, suprapubic region. Suprapubic ttp > RLQ ttp. No peritoneal signs. Incidental pregnancy on last visit to ED 8/28. U/S at that time without confirmed IUP. Vitals reassuring, no significant comorbidities. Labs, imaging pending.     CBC with anemia; chronic--similar to previous. No lightheadedness/dizziness/weakness. CMP with evidence of mild dehydration, however no dangerous abnormalities. hcg >225k, elevated from previous. Urinalysis with infection, although contaminated specimen. I will treat in pregnant patient with lower abdominal pain. Pelvic with +cmt and R adnexal ttp. No obvious mass or palpable fullness. No cervix changes. No gross bleeding. No evidence of obvious infection. D/D includes appendicitis vs ectopic vs adnexal mass vs torsion vs abscess. U/S with single, viable IUP. No pelvic free fluid. No visualization of R ovary. No obvious heterotopic or worrisome mass in adnexa. Lactic wnl. Unlikely PID despite +cmt given negative G/C 8/28.     On reevaluation, pt still with suprapubic ttp > RLQ ttp. Vitals remain reassuring, no fever. Low likelihood TOA or torsion. Overall, I feel she can be safely managed as an outpatient with very strict return precautions. Pt has spoken with  and myself ad nauseum on reasons to return, worrisome signs. She does  understand and agree.   Other:   I have discussed this case with another health care provider.       <> Summary of the Discussion: Dr. Camarena                      Clinical Impression:   The primary encounter diagnosis was Cystitis of pregnancy in first trimester. A diagnosis of Lower abdominal pain was also pertinent to this visit.      Disposition:   Disposition: Discharged  Condition: Stable       Jean Pierre Hill PA-C  09/27/18 0959

## 2018-09-26 NOTE — ED TRIAGE NOTES
Pt is currently pregnant. LMP early august. Pt c/o RLQ abdominal pain, N/V x2 days. Vomiting x5 today. Denies diarrhea, dysuria, vaginal discharge/bleeding, fever, chills. Denies taking meds PTA

## 2018-09-27 LAB
C TRACH DNA SPEC QL NAA+PROBE: NOT DETECTED
N GONORRHOEA DNA SPEC QL NAA+PROBE: NOT DETECTED

## 2018-09-27 NOTE — DISCHARGE INSTRUCTIONS
Take all antibiotics as prescribed.  Try to take with meals to limit nausea.  Continue current care.  Follow-up with your OBGYN for re-evaluation and further recommendations.  Return to this ED if you begin with fever, if pain worsens, if unable to tolerate by mouth intake, if any other problems occur.

## 2018-10-05 PROBLEM — O34.219 DELIVERY WITH HISTORY OF C-SECTION: Status: ACTIVE | Noted: 2018-10-05

## 2018-10-19 PROBLEM — O34.11 LEIOMYOMA OF UTERUS AFFECTING PREGNANCY IN FIRST TRIMESTER: Status: ACTIVE | Noted: 2018-10-19

## 2018-10-19 PROBLEM — D25.9 LEIOMYOMA OF UTERUS AFFECTING PREGNANCY IN FIRST TRIMESTER: Status: ACTIVE | Noted: 2018-10-19

## 2018-10-22 PROBLEM — O99.331 TOBACCO SMOKING AFFECTING PREGNANCY IN FIRST TRIMESTER: Status: ACTIVE | Noted: 2018-10-22

## 2018-12-18 ENCOUNTER — HOSPITAL ENCOUNTER (EMERGENCY)
Facility: HOSPITAL | Age: 28
Discharge: HOME | End: 2018-12-19
Attending: EMERGENCY MEDICINE
Payer: MEDICAID

## 2018-12-18 DIAGNOSIS — B37.31 YEAST VAGINITIS: ICD-10-CM

## 2018-12-18 DIAGNOSIS — B96.89 BV (BACTERIAL VAGINOSIS): ICD-10-CM

## 2018-12-18 DIAGNOSIS — O26.892 ABDOMINAL PAIN DURING PREGNANCY IN SECOND TRIMESTER: ICD-10-CM

## 2018-12-18 DIAGNOSIS — R10.9 ABDOMINAL PAIN DURING PREGNANCY IN SECOND TRIMESTER: ICD-10-CM

## 2018-12-18 DIAGNOSIS — O99.012 ANEMIA DURING PREGNANCY IN SECOND TRIMESTER: Primary | ICD-10-CM

## 2018-12-18 DIAGNOSIS — N76.0 BV (BACTERIAL VAGINOSIS): ICD-10-CM

## 2018-12-18 LAB
ALBUMIN SERPL BCP-MCNC: 3 G/DL
ALP SERPL-CCNC: 51 U/L
ALT SERPL W/O P-5'-P-CCNC: 10 U/L
ANION GAP SERPL CALC-SCNC: 8 MMOL/L
ANISOCYTOSIS BLD QL SMEAR: SLIGHT
AST SERPL-CCNC: 15 U/L
BACTERIA GENITAL QL WET PREP: ABNORMAL
BASOPHILS # BLD AUTO: 0.01 K/UL
BASOPHILS NFR BLD: 0.1 %
BILIRUB SERPL-MCNC: 0.3 MG/DL
BUN SERPL-MCNC: 5 MG/DL
BURR CELLS BLD QL SMEAR: ABNORMAL
CALCIUM SERPL-MCNC: 10.1 MG/DL
CHLORIDE SERPL-SCNC: 108 MMOL/L
CLUE CELLS VAG QL WET PREP: ABNORMAL
CO2 SERPL-SCNC: 21 MMOL/L
CREAT SERPL-MCNC: 0.6 MG/DL
DIFFERENTIAL METHOD: ABNORMAL
EOSINOPHIL # BLD AUTO: 0.1 K/UL
EOSINOPHIL NFR BLD: 0.5 %
ERYTHROCYTE [DISTWIDTH] IN BLOOD BY AUTOMATED COUNT: 19.1 %
EST. GFR  (AFRICAN AMERICAN): >60 ML/MIN/1.73 M^2
EST. GFR  (NON AFRICAN AMERICAN): >60 ML/MIN/1.73 M^2
FILAMENT FUNGI VAG WET PREP-#/AREA: ABNORMAL
GIANT PLATELETS BLD QL SMEAR: PRESENT
GLUCOSE SERPL-MCNC: 83 MG/DL
HCT VFR BLD AUTO: 26.3 %
HGB BLD-MCNC: 7.8 G/DL
LYMPHOCYTES # BLD AUTO: 2.1 K/UL
LYMPHOCYTES NFR BLD: 21.6 %
MCH RBC QN AUTO: 15.8 PG
MCHC RBC AUTO-ENTMCNC: 29.7 G/DL
MCV RBC AUTO: 53 FL
MONOCYTES # BLD AUTO: 0.6 K/UL
MONOCYTES NFR BLD: 6 %
NEUTROPHILS # BLD AUTO: 6.8 K/UL
NEUTROPHILS NFR BLD: 72.1 %
OVALOCYTES BLD QL SMEAR: ABNORMAL
PLATELET # BLD AUTO: 323 K/UL
PMV BLD AUTO: ABNORMAL FL
POIKILOCYTOSIS BLD QL SMEAR: SLIGHT
POLYCHROMASIA BLD QL SMEAR: ABNORMAL
POTASSIUM SERPL-SCNC: 3.6 MMOL/L
PROT SERPL-MCNC: 6.8 G/DL
RBC # BLD AUTO: 4.94 M/UL
SCHISTOCYTES BLD QL SMEAR: ABNORMAL
SODIUM SERPL-SCNC: 137 MMOL/L
SPECIMEN SOURCE: ABNORMAL
T VAGINALIS GENITAL QL WET PREP: ABNORMAL
TARGETS BLD QL SMEAR: ABNORMAL
WBC # BLD AUTO: 9.53 K/UL
WBC #/AREA VAG WET PREP: ABNORMAL
YEAST GENITAL QL WET PREP: ABNORMAL

## 2018-12-18 PROCEDURE — 87491 CHLMYD TRACH DNA AMP PROBE: CPT

## 2018-12-18 PROCEDURE — 63600175 PHARM REV CODE 636 W HCPCS: Performed by: NURSE PRACTITIONER

## 2018-12-18 PROCEDURE — 85025 COMPLETE CBC W/AUTO DIFF WBC: CPT

## 2018-12-18 PROCEDURE — 96374 THER/PROPH/DIAG INJ IV PUSH: CPT

## 2018-12-18 PROCEDURE — 81000 URINALYSIS NONAUTO W/SCOPE: CPT

## 2018-12-18 PROCEDURE — 96361 HYDRATE IV INFUSION ADD-ON: CPT

## 2018-12-18 PROCEDURE — 87086 URINE CULTURE/COLONY COUNT: CPT

## 2018-12-18 PROCEDURE — 87210 SMEAR WET MOUNT SALINE/INK: CPT

## 2018-12-18 PROCEDURE — 99284 EMERGENCY DEPT VISIT MOD MDM: CPT | Mod: 25

## 2018-12-18 PROCEDURE — 80053 COMPREHEN METABOLIC PANEL: CPT

## 2018-12-18 PROCEDURE — 25000003 PHARM REV CODE 250: Performed by: NURSE PRACTITIONER

## 2018-12-18 RX ORDER — ONDANSETRON 2 MG/ML
4 INJECTION INTRAMUSCULAR; INTRAVENOUS
Status: COMPLETED | OUTPATIENT
Start: 2018-12-18 | End: 2018-12-18

## 2018-12-18 RX ORDER — ACETAMINOPHEN 325 MG/1
650 TABLET ORAL
Status: COMPLETED | OUTPATIENT
Start: 2018-12-18 | End: 2018-12-18

## 2018-12-18 RX ADMIN — SODIUM CHLORIDE 1000 ML: 0.9 INJECTION, SOLUTION INTRAVENOUS at 11:12

## 2018-12-18 RX ADMIN — ACETAMINOPHEN 650 MG: 325 TABLET ORAL at 08:12

## 2018-12-18 RX ADMIN — ONDANSETRON 4 MG: 2 INJECTION INTRAMUSCULAR; INTRAVENOUS at 09:12

## 2018-12-18 RX ADMIN — SODIUM CHLORIDE 1000 ML: 0.9 INJECTION, SOLUTION INTRAVENOUS at 09:12

## 2018-12-19 VITALS
WEIGHT: 153 LBS | OXYGEN SATURATION: 98 % | RESPIRATION RATE: 18 BRPM | BODY MASS INDEX: 26.12 KG/M2 | TEMPERATURE: 99 F | HEART RATE: 88 BPM | SYSTOLIC BLOOD PRESSURE: 116 MMHG | DIASTOLIC BLOOD PRESSURE: 64 MMHG | HEIGHT: 64 IN

## 2018-12-19 LAB
BILIRUB UR QL STRIP: NEGATIVE
CLARITY UR: ABNORMAL
COLOR UR: YELLOW
GLUCOSE UR QL STRIP: NEGATIVE
HGB UR QL STRIP: NEGATIVE
KETONES UR QL STRIP: ABNORMAL
LEUKOCYTE ESTERASE UR QL STRIP: ABNORMAL
MICROSCOPIC COMMENT: NORMAL
NITRITE UR QL STRIP: NEGATIVE
PH UR STRIP: 6 [PH] (ref 5–8)
PROT UR QL STRIP: NEGATIVE
SP GR UR STRIP: 1.02 (ref 1–1.03)
SQUAMOUS #/AREA URNS HPF: 7 /HPF
URN SPEC COLLECT METH UR: ABNORMAL
UROBILINOGEN UR STRIP-ACNC: NEGATIVE EU/DL
WBC #/AREA URNS HPF: 5 /HPF (ref 0–5)

## 2018-12-19 RX ORDER — METRONIDAZOLE 7.5 MG/G
1 GEL VAGINAL NIGHTLY
Qty: 70 G | Refills: 0 | Status: SHIPPED | OUTPATIENT
Start: 2018-12-19 | End: 2018-12-24

## 2018-12-19 RX ORDER — ONDANSETRON 4 MG/1
4 TABLET, FILM COATED ORAL EVERY 6 HOURS PRN
Qty: 12 TABLET | Refills: 0 | Status: ON HOLD | OUTPATIENT
Start: 2018-12-19 | End: 2019-04-29 | Stop reason: HOSPADM

## 2018-12-19 NOTE — ED PROVIDER NOTES
Encounter Date: 2018    SCRIBE #1 NOTE: I, Nika Elena, am scribing for, and in the presence of,  PURNIMA Musa. I have scribed the following portions of the note - Other sections scribed: HPI and ROS.       History     Chief Complaint   Patient presents with    Pelvic Pain     pt reports lower pelvic & periumbilical abdominal pain ongoing constant all day; pt reports being 16 weeks pregnant, saw OBGYN ([facility]) last Thursday, & reports normal pregnancy (); pt denies vaginal bleeding but reports white discharge;    Abdominal Pain    Vaginal Discharge     CC: Pelvic Pain; Abdominal Pain    HPI: This A0 16 weeks gravid 28 y.o female who has anxiety and heart valve problem presents to the ED c/o acute, constant, 9/10 sharp, stabbing lower abdominal pain/pelvic pain that began today.  Patient also reports of nausea and 4-5 episodes of emesis.  Patient also reports of white vaginal discharge.  Patient denies fever, chills, vaginal bleeding, rash, dysuria, diarrhea, constipation, or any other associated symptoms.  Patient reports her last normal bowel movement last night.  No prior tx.  No alleviating factors.  Patient reports her next OB appointment on 2018.      The history is provided by the patient. No  was used.     Review of patient's allergies indicates:   Allergen Reactions    Hydroxyzine Swelling    Paroxetine Other (See Comments)     Lethargy     Past Medical History:   Diagnosis Date    Anxiety     Heart valve problem      Past Surgical History:   Procedure Laterality Date    CARDIAC SURGERY       SECTION       History reviewed. No pertinent family history.  Social History     Tobacco Use    Smoking status: Current Some Day Smoker     Packs/day: 0.00     Types: Cigarettes    Smokeless tobacco: Never Used   Substance Use Topics    Alcohol use: Yes     Comment: occassionally    Drug use: No     Review of Systems    Constitutional: Negative for chills and fever.   HENT: Negative for congestion, ear pain, rhinorrhea and sore throat.    Eyes: Negative for pain.   Respiratory: Negative for cough and shortness of breath.    Cardiovascular: Negative for chest pain.   Gastrointestinal: Positive for abdominal pain, nausea and vomiting. Negative for diarrhea.   Genitourinary: Positive for pelvic pain and vaginal discharge. Negative for difficulty urinating, dysuria, frequency, hematuria and vaginal bleeding.   Musculoskeletal: Negative for back pain.   Skin: Negative for rash.   Neurological: Negative for dizziness, weakness, numbness and headaches.       Physical Exam     Initial Vitals [12/18/18 1956]   BP Pulse Resp Temp SpO2   (!) 105/59 102 17 98.8 °F (37.1 °C) 100 %      MAP       --         Physical Exam    Constitutional: She appears well-developed and well-nourished. She is not diaphoretic. No distress.   HENT:   Head: Normocephalic and atraumatic.   Neck: Normal range of motion.   Cardiovascular: Normal rate, regular rhythm and normal heart sounds. Exam reveals no gallop and no friction rub.    No murmur heard.  Pulmonary/Chest: Breath sounds normal. No respiratory distress.   Abdominal: Soft. Bowel sounds are normal. She exhibits no distension. There is tenderness. There is no rigidity, no rebound, no guarding and negative Bustamante's sign.   Appropriately gravid abdomen.  Soft. Mild tenderness with palpation in the right lower, suprapubic, left lower quadrants.  No guarding or rigidity.   Genitourinary: Cervix exhibits no motion tenderness, no discharge and no friability. Right adnexum displays no mass, no tenderness and no fullness. Left adnexum displays no mass, no tenderness and no fullness. Vaginal discharge found.   Genitourinary Comments: Scant mucoid discharge in the vaginal.  Cervical os closed.  No bleeding.   Musculoskeletal: Normal range of motion.   Neurological: She is alert and oriented to person, place, and  time.   Skin: Skin is warm and dry.   Psychiatric: She has a normal mood and affect. Her behavior is normal.         ED Course   Procedures  Labs Reviewed   URINALYSIS, REFLEX TO URINE CULTURE - Abnormal; Notable for the following components:       Result Value    Appearance, UA Hazy (*)     Ketones, UA 2+ (*)     Leukocytes, UA 1+ (*)     All other components within normal limits    Narrative:     Preferred Collection Type->Urine, Clean Catch   CBC W/ AUTO DIFFERENTIAL - Abnormal; Notable for the following components:    Hemoglobin 7.8 (*)     Hematocrit 26.3 (*)     MCV 53 (*)     MCH 15.8 (*)     MCHC 29.7 (*)     RDW 19.1 (*)     All other components within normal limits   COMPREHENSIVE METABOLIC PANEL - Abnormal; Notable for the following components:    CO2 21 (*)     BUN, Bld 5 (*)     Albumin 3.0 (*)     Alkaline Phosphatase 51 (*)     All other components within normal limits   VAGINAL SCREEN - Abnormal; Notable for the following components:    Clue Cells, Wet Prep Occasional (*)     Budding Yeast Rare (*)     WBC - Vaginal Screen Rare (*)     Bacteria - Vaginal Screen Few (*)     All other components within normal limits   C. TRACHOMATIS/N. GONORRHOEAE BY AMP DNA   CULTURE, URINE   URINALYSIS MICROSCOPIC    Narrative:     Preferred Collection Type->Urine, Clean Catch          Imaging Results    None          Medical Decision Making:   ED Management:  This is the evaluation of a 28-year-old 16 week gravid female presenting with lower abdominal pain, nausea, vomiting. She has received prenatal care and has an IUP confirmed by ultrasound.  Patient has been evaluated for similar pain in this emergency department during this pregnancy.  On exam, she is well-appearing.  She has moist mucous membranes.  She does not appear dehydrated.  Abdomen is soft and appropriately gravid.  No guarding or rigidity.  Mild tenderness with palpation of the left lower, suprapubic, right lower quadrants.   exam with closed  cervical os.  Scant white discharge in the vaginal vault.  No bleeding.    Vital signs are reassuring.  I performed a bedside transabdominal ultrasound which I visualized an active fetus.  Fetal heart measured at 150 beats per minute.     Patient given IV fluids, Zofran, Tylenol with good relief in symptoms. She was monitored in the ER for 6 hr with no further episodes of emesis.  She was able to fall sleep in the recliner.  Upon reassessment, she reports feeling improved and would like to go home.  Repeat abdominal exam is benign.  No tenderness. I doubt acute intra-abdominal process such as acute appendicitis, diverticulitis.  Furthermore no leukocytosis on CBC.  She is afebrile.  No infection on UA to suggest UTI.  Clue cells and yeast on vaginal screen.  Metrogel for BV.  Patient is anemic with H&H of 7.8 and 26.3.  I reviewed previous lab work on file and she appears chronically anemic.  No significant drop in H&H.  She denies bleeding, weakness, fatigue, shortness of breath, chest pain.  Will discharge patient home with instructions follow up with OBGYN in the morning for further evaluation.    This case was discussed with my attending physician, Dr. Poon who agrees with my plan of care.    Addendum:  After patient was discharged, I [facility].  Last transvaginal ultrasound confirming IUP was in October placing LISS at May 3, 2019.  She is in fact 20 weeks 5 days pregnant, not 15 weeks as she insisted.  Attempted to call patient at phone numbers listed to inform her to return to the hospital for fetal monitoring.  Phone numbers appear disconnected.  Send message to patient's OBGYN, [facility].            Scribe Attestation:   Scribe #1: I performed the above scribed service and the documentation accurately describes the services I performed. I attest to the accuracy of the note.    Attending Attestation:           Physician Attestation for  Scribe:  Physician Attestation Statement for Scribe #1: I, Akosua Rice, NP-C, reviewed documentation, as scribed by Nika Elena in my presence, and it is both accurate and complete.                    Clinical Impression:   The primary encounter diagnosis was Anemia during pregnancy in second trimester. Diagnoses of Abdominal pain during pregnancy in second trimester, Yeast vaginitis, and BV (bacterial vaginosis) were also pertinent to this visit.      Disposition:   Disposition: Discharged  Condition: Stable                        Akosua Rice NP  12/19/18 [id number]

## 2018-12-19 NOTE — DISCHARGE INSTRUCTIONS
Please follow up with your OBGYN today.  You may take Tylenol for pain. Drink plenty of fluids to stay hydrated.    Please return to the Emergency Department for any new or worsening symptoms including: worsening abdominal pain, dark\black\bloody bowel movements, vomiting blood, hard abdomen, fever, chest pain, shortness of breath, loss of consciousness or any other concerns.

## 2018-12-21 LAB
BACTERIA UR CULT: NORMAL
C TRACH DNA SPEC QL NAA+PROBE: NOT DETECTED
N GONORRHOEA DNA SPEC QL NAA+PROBE: NOT DETECTED

## 2019-01-29 DIAGNOSIS — Q24.9 MATERNAL CONGENITAL HEART DISEASE IN THIRD TRIMESTER, ANTEPARTUM: Primary | ICD-10-CM

## 2019-01-29 DIAGNOSIS — O99.413 MATERNAL CONGENITAL HEART DISEASE IN THIRD TRIMESTER, ANTEPARTUM: Primary | ICD-10-CM

## 2019-02-07 PROBLEM — R82.5 POSITIVE URINE DRUG SCREEN: Status: ACTIVE | Noted: 2019-02-07

## 2019-02-12 ENCOUNTER — OFFICE VISIT (OUTPATIENT)
Dept: PEDIATRIC CARDIOLOGY | Facility: CLINIC | Age: 29
End: 2019-02-12
Attending: PEDIATRICS
Payer: MEDICAID

## 2019-02-12 DIAGNOSIS — Q24.9 MATERNAL CONGENITAL HEART DISEASE IN THIRD TRIMESTER, ANTEPARTUM: ICD-10-CM

## 2019-02-12 DIAGNOSIS — O99.413 MATERNAL CONGENITAL HEART DISEASE IN THIRD TRIMESTER, ANTEPARTUM: ICD-10-CM

## 2019-02-12 DIAGNOSIS — Q24.9 CONGENITAL HEART DEFECT: Primary | ICD-10-CM

## 2019-02-12 PROCEDURE — 93325 DOPPLER ECHO COLOR FLOW MAPG: CPT | Mod: PBBFAC | Performed by: PEDIATRICS

## 2019-02-12 PROCEDURE — 99212 OFFICE O/P EST SF 10 MIN: CPT | Mod: PBBFAC,25 | Performed by: PEDIATRICS

## 2019-02-12 PROCEDURE — 99203 OFFICE O/P NEW LOW 30 MIN: CPT | Mod: 25,S$PBB,, | Performed by: PEDIATRICS

## 2019-02-12 PROCEDURE — 93325 PR DOPPLER COLOR FLOW VELOCITY MAP: ICD-10-PCS | Mod: 26,S$PBB,, | Performed by: PEDIATRICS

## 2019-02-12 PROCEDURE — 76827 ECHO EXAM OF FETAL HEART: CPT | Mod: PBBFAC | Performed by: PEDIATRICS

## 2019-02-12 PROCEDURE — 93325 DOPPLER ECHO COLOR FLOW MAPG: CPT | Mod: 26,S$PBB,, | Performed by: PEDIATRICS

## 2019-02-12 PROCEDURE — 99999 PR PBB SHADOW E&M-EST. PATIENT-LVL II: CPT | Mod: PBBFAC,,, | Performed by: PEDIATRICS

## 2019-02-12 PROCEDURE — 99203 PR OFFICE/OUTPT VISIT, NEW, LEVL III, 30-44 MIN: ICD-10-PCS | Mod: 25,S$PBB,, | Performed by: PEDIATRICS

## 2019-02-12 PROCEDURE — 76825 ECHO EXAM OF FETAL HEART: CPT | Mod: PBBFAC | Performed by: PEDIATRICS

## 2019-02-12 PROCEDURE — 76825 ECHO EXAM OF FETAL HEART: CPT | Mod: 26,S$PBB,, | Performed by: PEDIATRICS

## 2019-02-12 PROCEDURE — 76825 PR  SO2 FETAL HEART: ICD-10-PCS | Mod: 26,S$PBB,, | Performed by: PEDIATRICS

## 2019-02-12 PROCEDURE — 76827 ECHO EXAM OF FETAL HEART: CPT | Mod: 26,S$PBB,, | Performed by: PEDIATRICS

## 2019-02-12 PROCEDURE — 99999 PR PBB SHADOW E&M-EST. PATIENT-LVL II: ICD-10-PCS | Mod: PBBFAC,,, | Performed by: PEDIATRICS

## 2019-02-12 PROCEDURE — 76827 PR  SO2 FETAL HEART DOPPLER: ICD-10-PCS | Mod: 26,S$PBB,, | Performed by: PEDIATRICS

## 2019-02-12 NOTE — PROGRESS NOTES
"Ms. Wilkerson  is a 28 y.o. year old  , referred by Dr. Stokes because of the history of congenital heart disease.    The patient presented at approximately 28 4/7 weeks gestation.  The patient denied any complaints.    Past medical history: Significant for history of a "leaky valve".  Past surgical history: S/P heart surgery at few days of age.  S/P C/S x 2.  Past gestational history: The patient has two healthy children (6 y/o girl, 5 y/o boy).    Family history: Negative for congenital heart disease, and sudden death during childhood.      Medications:   Outpatient Encounter Medications as of 2019   Medication Sig Dispense Refill    prenatal no115-iron-folic acid (PRENATAL 19) 29 mg iron- 1 mg Chew Take 1 tablet by mouth once daily. 30 tablet 11    ferrous sulfate (FEOSOL) 325 mg (65 mg iron) Tab tablet Take 1 tablet (325 mg total) by mouth once daily. 60 tablet 3    ondansetron (ZOFRAN) 4 MG tablet Take 1 tablet (4 mg total) by mouth every 6 (six) hours as needed for Nausea. 12 tablet 0     No facility-administered encounter medications on file as of 2019.        Allergies: Hydroxyzine and Paroxetine    There were no vitals taken for this visit.    Fetal echocardiogram revealed a four chamber fetal heart with situs solitus.  The ventricles appeared to be equal in size.  The contractility of both ventricles was good.  The fetal heart rate was within the normal range, and regular.  The interventricular septum appeared to be intact.  There were normally related great arteries seen.  The ductal and aortic arch were well visualized, and appeared to be widely patent.  There was no pleural or pericardial effusion seen.    Doppler analysis revealed a three vessel umbilical cord, with normal flow patterns, and velocities by Doppler.  There was a normal flow pattern seen in the ductus venosus.  There was evidence of normal systemic, and pulmonary venous return seen.  There was a normal right to left shunt " seen across the foramen ovale.  There were normal inflow patterns seen across the AV-valves, without significant insufficiency.  There was no ventricular level shunt seen.  The right and left ventricular outflow tract, and ductal and aortic arch appeared to be unobstructed.    Impression:  It is our impression that Ms. Wilkerson had a normal fetal echocardiogram.  As you know, small defects, and coarctation of the aorta cannot always be ruled out on fetal echocardiogram.  We discussed our findings with the patient, reviewed our images, and answered her questions. We also discussed the limitations of fetal echocardiography, but emphasized that her child appears to have normal cardiac anatomy.  No further follow up is scheduled in our clinic, but, of course, we will always be available to reevaluate this patient, if needed.    The above information was discussed in detail including the use of diagrams, with 30 minutes of total face to face time, with greater than 50% with counseling and coordination of care.  The discussion of the diagnosis and treatment options is as described above.      Time spent: 30 minutes, 50% dedicated to counseling.

## 2019-02-27 ENCOUNTER — HOSPITAL ENCOUNTER (EMERGENCY)
Facility: HOSPITAL | Age: 29
Discharge: HOME | End: 2019-02-27
Attending: EMERGENCY MEDICINE
Payer: MEDICAID

## 2019-02-27 VITALS
SYSTOLIC BLOOD PRESSURE: 110 MMHG | BODY MASS INDEX: 31.24 KG/M2 | WEIGHT: 183 LBS | OXYGEN SATURATION: 100 % | RESPIRATION RATE: 16 BRPM | HEART RATE: 95 BPM | HEIGHT: 64 IN | DIASTOLIC BLOOD PRESSURE: 57 MMHG | TEMPERATURE: 99 F

## 2019-02-27 DIAGNOSIS — Z3A.30 PREGNANCY WITH 30 COMPLETED WEEKS GESTATION: ICD-10-CM

## 2019-02-27 DIAGNOSIS — D64.9 ANEMIA, UNSPECIFIED TYPE: ICD-10-CM

## 2019-02-27 DIAGNOSIS — R51.9 HEADACHE IN FRONT OF HEAD: Primary | ICD-10-CM

## 2019-02-27 LAB
ALBUMIN SERPL BCP-MCNC: 2.7 G/DL
ALP SERPL-CCNC: 87 U/L
ALT SERPL W/O P-5'-P-CCNC: 6 U/L
ANION GAP SERPL CALC-SCNC: 6 MMOL/L
ANISOCYTOSIS BLD QL SMEAR: SLIGHT
APTT BLDCRRT: 26.1 SEC
AST SERPL-CCNC: 10 U/L
BASOPHILS # BLD AUTO: 0.01 K/UL
BASOPHILS NFR BLD: 0.1 %
BILIRUB SERPL-MCNC: 0.3 MG/DL
BILIRUB UR QL STRIP: NEGATIVE
BUN SERPL-MCNC: 4 MG/DL
BURR CELLS BLD QL SMEAR: ABNORMAL
CALCIUM SERPL-MCNC: 9.6 MG/DL
CHLORIDE SERPL-SCNC: 109 MMOL/L
CLARITY UR: ABNORMAL
CO2 SERPL-SCNC: 21 MMOL/L
COLOR UR: YELLOW
CREAT SERPL-MCNC: 0.6 MG/DL
DACRYOCYTES BLD QL SMEAR: ABNORMAL
DIFFERENTIAL METHOD: ABNORMAL
EOSINOPHIL # BLD AUTO: 0.2 K/UL
EOSINOPHIL NFR BLD: 2.2 %
ERYTHROCYTE [DISTWIDTH] IN BLOOD BY AUTOMATED COUNT: 20.3 %
EST. GFR  (AFRICAN AMERICAN): >60 ML/MIN/1.73 M^2
EST. GFR  (NON AFRICAN AMERICAN): >60 ML/MIN/1.73 M^2
GLUCOSE SERPL-MCNC: 77 MG/DL
GLUCOSE UR QL STRIP: NEGATIVE
HCT VFR BLD AUTO: 27.3 %
HGB BLD-MCNC: 7.8 G/DL
HGB UR QL STRIP: NEGATIVE
HYPOCHROMIA BLD QL SMEAR: ABNORMAL
INR PPP: 1
KETONES UR QL STRIP: NEGATIVE
LEUKOCYTE ESTERASE UR QL STRIP: ABNORMAL
LYMPHOCYTES # BLD AUTO: 1.5 K/UL
LYMPHOCYTES NFR BLD: 20.3 %
MCH RBC QN AUTO: 15.3 PG
MCHC RBC AUTO-ENTMCNC: 28.6 G/DL
MCV RBC AUTO: 54 FL
MICROSCOPIC COMMENT: NORMAL
MONOCYTES # BLD AUTO: 0.6 K/UL
MONOCYTES NFR BLD: 8.4 %
NEUTROPHILS # BLD AUTO: 5 K/UL
NEUTROPHILS NFR BLD: 69.3 %
NITRITE UR QL STRIP: NEGATIVE
OVALOCYTES BLD QL SMEAR: ABNORMAL
PH UR STRIP: 6 [PH] (ref 5–8)
PLATELET # BLD AUTO: 410 K/UL
PLATELET BLD QL SMEAR: ABNORMAL
PMV BLD AUTO: ABNORMAL FL
POIKILOCYTOSIS BLD QL SMEAR: ABNORMAL
POLYCHROMASIA BLD QL SMEAR: ABNORMAL
POTASSIUM SERPL-SCNC: 3.6 MMOL/L
PROT SERPL-MCNC: 6.3 G/DL
PROT UR QL STRIP: NEGATIVE
PROTHROMBIN TIME: 10.4 SEC
RBC # BLD AUTO: 5.09 M/UL
RBC #/AREA URNS HPF: 3 /HPF (ref 0–4)
SODIUM SERPL-SCNC: 136 MMOL/L
SP GR UR STRIP: 1.01 (ref 1–1.03)
SQUAMOUS #/AREA URNS HPF: 15 /HPF
TARGETS BLD QL SMEAR: ABNORMAL
URN SPEC COLLECT METH UR: ABNORMAL
UROBILINOGEN UR STRIP-ACNC: ABNORMAL EU/DL
WBC # BLD AUTO: 7.29 K/UL
WBC #/AREA URNS HPF: 5 /HPF (ref 0–5)

## 2019-02-27 PROCEDURE — 96365 THER/PROPH/DIAG IV INF INIT: CPT

## 2019-02-27 PROCEDURE — 81000 URINALYSIS NONAUTO W/SCOPE: CPT

## 2019-02-27 PROCEDURE — 85610 PROTHROMBIN TIME: CPT

## 2019-02-27 PROCEDURE — 96375 TX/PRO/DX INJ NEW DRUG ADDON: CPT

## 2019-02-27 PROCEDURE — 99284 EMERGENCY DEPT VISIT MOD MDM: CPT | Mod: 25

## 2019-02-27 PROCEDURE — 85025 COMPLETE CBC W/AUTO DIFF WBC: CPT

## 2019-02-27 PROCEDURE — 85730 THROMBOPLASTIN TIME PARTIAL: CPT

## 2019-02-27 PROCEDURE — 25000003 PHARM REV CODE 250: Performed by: EMERGENCY MEDICINE

## 2019-02-27 PROCEDURE — 80053 COMPREHEN METABOLIC PANEL: CPT

## 2019-02-27 PROCEDURE — 63600175 PHARM REV CODE 636 W HCPCS: Performed by: EMERGENCY MEDICINE

## 2019-02-27 RX ORDER — DIPHENHYDRAMINE HYDROCHLORIDE 50 MG/ML
25 INJECTION INTRAMUSCULAR; INTRAVENOUS
Status: COMPLETED | OUTPATIENT
Start: 2019-02-27 | End: 2019-02-27

## 2019-02-27 RX ORDER — PROMETHAZINE HYDROCHLORIDE 25 MG/1
25 TABLET ORAL EVERY 6 HOURS PRN
Qty: 15 TABLET | Refills: 0 | Status: ON HOLD | OUTPATIENT
Start: 2019-02-27 | End: 2019-04-29 | Stop reason: HOSPADM

## 2019-02-27 RX ADMIN — PROMETHAZINE HYDROCHLORIDE 12.5 MG: 25 INJECTION INTRAMUSCULAR; INTRAVENOUS at 09:02

## 2019-02-27 RX ADMIN — DIPHENHYDRAMINE HYDROCHLORIDE 25 MG: 50 INJECTION INTRAMUSCULAR; INTRAVENOUS at 09:02

## 2019-02-27 NOTE — ED PROVIDER NOTES
"Encounter Date: 2019    SCRIBE #1 NOTE: I, Artem Ramsey, am scribing for, and in the presence of,  Mathew Peña MD. I have scribed the following portions of the note - Other sections scribed: HPI, ROS.       History     Chief Complaint   Patient presents with    Pregnant  Anxiety/SOB     30 wks PREGNANT. denies abdominal pain, states "I'm catching an anxiety attack. It's hard for me to breath." denies cough     CC: Pregnant Anxiety    HPI: This 28 y.o. 30w Gravid A0 Female with anxiety presents to the ED for an evaluation of frontal headaches with anxiety since yesterday evening. Pt reports having an anxiety attack today with mild SOB and her most recent headache was also with an anxiety attack. She has no vision, hearing or movement deficits from her symptoms. Pt has not taken any meds for her symptoms and admits tylenol "never works." She denies nausea, emesis, fever, chest pain or current SOB.    OB: Dr. Stokes      The history is provided by the patient. No  was used.     Review of patient's allergies indicates:   Allergen Reactions    Hydroxyzine Swelling    Paroxetine Other (See Comments)     Lethargy     Past Medical History:   Diagnosis Date    Anxiety     Heart valve problem      Past Surgical History:   Procedure Laterality Date    CARDIAC SURGERY       SECTION       Family History   Problem Relation Age of Onset    Arrhythmia Neg Hx     Cardiomyopathy Neg Hx     Congenital heart disease Neg Hx     Early death Neg Hx     Heart attacks under age 50 Neg Hx     Pacemaker/defibrilator Neg Hx      Social History     Tobacco Use    Smoking status: Current Some Day Smoker     Packs/day: 0.00     Types: Cigarettes    Smokeless tobacco: Never Used   Substance Use Topics    Alcohol use: Yes     Comment: occassionally    Drug use: No     Review of Systems   Constitutional: Negative for chills and fever.   HENT: Negative for ear pain, rhinorrhea and sore " throat.    Eyes: Negative for redness.   Respiratory: Negative for shortness of breath.    Cardiovascular: Negative for chest pain.   Gastrointestinal: Negative for abdominal pain, diarrhea, nausea and vomiting.   Genitourinary: Negative for dysuria and hematuria.   Musculoskeletal: Negative for back pain and neck pain.   Skin: Negative for rash.   Neurological: Positive for headaches. Negative for weakness and numbness.   Hematological: Does not bruise/bleed easily.   Psychiatric/Behavioral: The patient is nervous/anxious.        Physical Exam     Initial Vitals [02/27/19 0800]   BP Pulse Resp Temp SpO2   (!) 125/58 (!) 117 20 98.8 °F (37.1 °C) 100 %      MAP       --         Physical Exam    ED Course   Procedures  Labs Reviewed - No data to display       Imaging Results    None                     Scribe Attestation:   Scribe #1: I performed the above scribed service and the documentation accurately describes the services I performed. I attest to the accuracy of the note.    Attending Attestation:           Physician Attestation for Scribe:  Physician Attestation Statement for Scribe #1: I, Mathew Peña MD, reviewed documentation, as scribed by Artem Ramsey in my presence, and it is both accurate and complete.                    Clinical Impression:   No diagnosis found.

## 2019-02-27 NOTE — ED PROVIDER NOTES
"Encounter Date: 2019       History     Chief Complaint   Patient presents with    Pregnant  Anxiety/SOB     30 wks PREGNANT. denies abdominal pain, states "I'm catching an anxiety attack. It's hard for me to breath." denies cough     HPI      CC: Pregnant Anxiety     HPI: This 28 y.o. 30w Gravid A0 Female with anxiety presents to the ED for an evaluation of frontal headaches with anxiety since yesterday evening. Pt reports having an anxiety attack today with mild SOB and her most recent headache was also with an anxiety attack. She has no vision, hearing or movement deficits from her symptoms. Pt has not taken any meds for her symptoms and admits tylenol "never works." She denies nausea, emesis, fever, chest pain or current SOB.     OB: Dr. Stokes        The history is provided by the patient. No  was used.            Review of patient's allergies indicates:   Allergen Reactions    Hydroxyzine Swelling    Paroxetine Other (See Comments)       Lethargy           Past Medical History:   Diagnosis Date    Anxiety      Heart valve problem              Past Surgical History:   Procedure Laterality Date    CARDIAC SURGERY         SECTION                Family History   Problem Relation Age of Onset    Arrhythmia Neg Hx      Cardiomyopathy Neg Hx      Congenital heart disease Neg Hx      Early death Neg Hx      Heart attacks under age 50 Neg Hx      Pacemaker/defibrilator Neg Hx        Social History            Tobacco Use    Smoking status: Current Some Day Smoker       Packs/day: 0.00       Types: Cigarettes    Smokeless tobacco: Never Used   Substance Use Topics    Alcohol use: Yes       Comment: occassionally    Drug use: No      Review of Systems   Constitutional: Negative for chills and fever.   HENT: Negative for ear pain, rhinorrhea and sore throat.    Eyes: Negative for redness.   Respiratory: Negative for shortness of breath.    Cardiovascular: Negative " for chest pain.   Gastrointestinal: Negative for abdominal pain, diarrhea, nausea and vomiting.   Genitourinary: Negative for dysuria and hematuria.   Musculoskeletal: Negative for back pain and neck pain.   Skin: Negative for rash.   Neurological: Positive for headaches. Negative for weakness and numbness.   Hematological: Does not bruise/bleed easily.   Psychiatric/Behavioral: The patient is nervous/anxious.                   Review of patient's allergies indicates:   Allergen Reactions    Hydroxyzine Swelling    Paroxetine Other (See Comments)     Lethargy     Past Medical History:   Diagnosis Date    Anxiety     Heart valve problem      Past Surgical History:   Procedure Laterality Date    CARDIAC SURGERY       SECTION       Family History   Problem Relation Age of Onset    Arrhythmia Neg Hx     Cardiomyopathy Neg Hx     Congenital heart disease Neg Hx     Early death Neg Hx     Heart attacks under age 50 Neg Hx     Pacemaker/defibrilator Neg Hx      Social History     Tobacco Use    Smoking status: Current Some Day Smoker     Packs/day: 0.00     Types: Cigarettes    Smokeless tobacco: Never Used   Substance Use Topics    Alcohol use: Yes     Comment: occassionally    Drug use: No     Review of Systems    Physical Exam     Initial Vitals [19 0800]   BP Pulse Resp Temp SpO2   (!) 125/58 (!) 117 20 98.8 °F (37.1 °C) 100 %      MAP       --         Physical Exam  The patient was examined specifically for the following:   General:No significant distress, Good color, Warm and dry. Head and neck:Scalp atraumatic, Neck supple. Neurological:Appropriate conversation, Gross motor deficits. Eyes:Conjugate gaze, Clear corneas. ENT: No epistaxis. Cardiac: Regular rate and rhythm, Grossly normal heart tones. Pulmonary: Wheezing, Rales. Gastrointestinal: Abdominal tenderness, Abdominal distention. Musculoskeletal: Extremity deformity, Apparent pain with range of motion of the joints. Skin: Rash.    The findings on examination were normal except for the following:  Patient's heart rate is 106 during the physical examination. The lungs are clear and free of wheezing rales or rhonchi.  There is no evidence of respiratory distress. Mental status examination, cranial nerves, motor and sensory examination are normal. The neck is supple.  ED Course   Procedures  Labs Reviewed   COMPREHENSIVE METABOLIC PANEL - Abnormal; Notable for the following components:       Result Value    CO2 21 (*)     BUN, Bld 4 (*)     Albumin 2.7 (*)     ALT 6 (*)     Anion Gap 6 (*)     All other components within normal limits   CBC W/ AUTO DIFFERENTIAL - Abnormal; Notable for the following components:    Hemoglobin 7.8 (*)     Hematocrit 27.3 (*)     MCV 54 (*)     MCH 15.3 (*)     MCHC 28.6 (*)     RDW 20.3 (*)     Platelets 410 (*)     Platelet Estimate Increased (*)     All other components within normal limits   URINALYSIS, REFLEX TO URINE CULTURE - Abnormal; Notable for the following components:    Appearance, UA Cloudy (*)     Urobilinogen, UA 2.0-3.0 (*)     Leukocytes, UA 3+ (*)     All other components within normal limits    Narrative:     Preferred Collection Type->Urine, Clean Catch   APTT   PROTIME-INR   URINALYSIS MICROSCOPIC    Narrative:     Preferred Collection Type->Urine, Clean Catch          Imaging Results    None       Medical decision making:  Given the above, this patient presents to the emergency with a frontal headache.  There is no protein urea there is no uncontrolled hypertension platelet count is normal. AST ALT are normal.  Creatinine is normal. I do not think that this patient has eclampsia or preeclampsia.  She is 30 weeks.  She had good fetal movement on the physical examination. The case was reviewed in detail with Dr.Jean Stokes.  The feels the patient is stable for discharge. The patient had no shortness of breath in the emergency room.  The patient has chronic recurrent frontal headaches that  she attributes to anxiety. The symptoms responded the Phenergan.  I will refer the patient back to follow up with Dr. Stokes.  She would like to see her in 2 days.                          Clinical Impression:       ICD-10-CM ICD-9-CM   1. Headache in front of head R51 784.0   2. Pregnancy with 30 completed weeks gestation Z3A.30 V22.2   3. Anemia, unspecified type D64.9 285.9                                Mathew Peña MD  02/27/19 1116

## 2019-02-27 NOTE — ED TRIAGE NOTES
Reports being 30 weeks pregnant, with HA that started Wednesday with accompanied anxiety, with SOB. Denies taking medication to ease HA. Denies abdominal cramping, photophobia, n/v, Cp, SOB at this time, and vaginal bleeding

## 2019-02-27 NOTE — DISCHARGE INSTRUCTIONS
Phenergan for headaches.  Please return immediately if you get worse or if new problems develop.  Please follow-up with your OBGYN doctor this week.  She wants to see you in the office in 2 days.  Call today for an appointment.  Rest.

## 2019-04-05 PROBLEM — O99.820 GBS (GROUP B STREPTOCOCCUS CARRIER), +RV CULTURE, CURRENTLY PREGNANT: Status: ACTIVE | Noted: 2019-04-05

## 2019-04-05 PROBLEM — D50.9 MICROCYTIC ANEMIA: Status: ACTIVE | Noted: 2019-04-05

## 2019-04-26 ENCOUNTER — HOSPITAL ENCOUNTER (INPATIENT)
Facility: HOSPITAL | Age: 29
LOS: 3 days | Discharge: HOME | End: 2019-04-29
Attending: OBSTETRICS & GYNECOLOGY | Admitting: OBSTETRICS & GYNECOLOGY
Payer: MEDICAID

## 2019-04-26 ENCOUNTER — ANESTHESIA EVENT (OUTPATIENT)
Dept: OBSTETRICS AND GYNECOLOGY | Facility: HOSPITAL | Age: 29
End: 2019-04-26
Payer: MEDICAID

## 2019-04-26 ENCOUNTER — ANESTHESIA (OUTPATIENT)
Dept: OBSTETRICS AND GYNECOLOGY | Facility: HOSPITAL | Age: 29
End: 2019-04-26
Payer: MEDICAID

## 2019-04-26 DIAGNOSIS — O34.219 DELIVERY WITH HISTORY OF C-SECTION: ICD-10-CM

## 2019-04-26 DIAGNOSIS — Z34.83 PRENATAL CARE, SUBSEQUENT PREGNANCY, THIRD TRIMESTER: Primary | ICD-10-CM

## 2019-04-26 LAB
ABO + RH BLD: NORMAL
AMPHET+METHAMPHET UR QL: NEGATIVE
ANISOCYTOSIS BLD QL SMEAR: ABNORMAL
BARBITURATES UR QL SCN>200 NG/ML: NEGATIVE
BASOPHILS # BLD AUTO: 0.02 K/UL (ref 0–0.2)
BASOPHILS NFR BLD: 0.3 % (ref 0–1.9)
BENZODIAZ UR QL SCN>200 NG/ML: NEGATIVE
BLD GP AB SCN CELLS X3 SERPL QL: NORMAL
BZE UR QL SCN: NEGATIVE
CANNABINOIDS UR QL SCN: NEGATIVE
CREAT UR-MCNC: 85.7 MG/DL (ref 15–325)
DACRYOCYTES BLD QL SMEAR: ABNORMAL
DIFFERENTIAL METHOD: ABNORMAL
EOSINOPHIL # BLD AUTO: 0.1 K/UL (ref 0–0.5)
EOSINOPHIL NFR BLD: 1.4 % (ref 0–8)
ERYTHROCYTE [DISTWIDTH] IN BLOOD BY AUTOMATED COUNT: 21.3 % (ref 11.5–14.5)
HCT VFR BLD AUTO: 27 % (ref 37–48.5)
HGB BLD-MCNC: 7.5 G/DL (ref 12–16)
HYPOCHROMIA BLD QL SMEAR: ABNORMAL
LYMPHOCYTES # BLD AUTO: 1.6 K/UL (ref 1–4.8)
LYMPHOCYTES NFR BLD: 25.4 % (ref 18–48)
MCH RBC QN AUTO: 14.5 PG (ref 27–31)
MCHC RBC AUTO-ENTMCNC: 27.8 G/DL (ref 32–36)
MCV RBC AUTO: 52 FL (ref 82–98)
METHADONE UR QL SCN>300 NG/ML: NEGATIVE
MONOCYTES # BLD AUTO: 0.7 K/UL (ref 0.3–1)
MONOCYTES NFR BLD: 11.4 % (ref 4–15)
NEUTROPHILS # BLD AUTO: 3.9 K/UL (ref 1.8–7.7)
NEUTROPHILS NFR BLD: 61.7 % (ref 38–73)
OPIATES UR QL SCN: NEGATIVE
OVALOCYTES BLD QL SMEAR: ABNORMAL
PCP UR QL SCN>25 NG/ML: NEGATIVE
PLATELET # BLD AUTO: 357 K/UL (ref 150–350)
PLATELET BLD QL SMEAR: ABNORMAL
PMV BLD AUTO: ABNORMAL FL (ref 9.2–12.9)
POIKILOCYTOSIS BLD QL SMEAR: SLIGHT
POLYCHROMASIA BLD QL SMEAR: ABNORMAL
RBC # BLD AUTO: 5.16 M/UL (ref 4–5.4)
RPR SER QL: NORMAL
TARGETS BLD QL SMEAR: ABNORMAL
TOXICOLOGY INFORMATION: NORMAL
WBC # BLD AUTO: 6.29 K/UL (ref 3.9–12.7)

## 2019-04-26 PROCEDURE — 63600175 PHARM REV CODE 636 W HCPCS: Performed by: OBSTETRICS & GYNECOLOGY

## 2019-04-26 PROCEDURE — 36000685 HC CESAREAN SECTION LEVEL I

## 2019-04-26 PROCEDURE — 85025 COMPLETE CBC W/AUTO DIFF WBC: CPT

## 2019-04-26 PROCEDURE — 25000003 PHARM REV CODE 250: Performed by: OBSTETRICS & GYNECOLOGY

## 2019-04-26 PROCEDURE — 63600175 PHARM REV CODE 636 W HCPCS: Performed by: ANESTHESIOLOGY

## 2019-04-26 PROCEDURE — 59514 CESAREAN DELIVERY ONLY: CPT | Mod: ANES,,, | Performed by: ANESTHESIOLOGY

## 2019-04-26 PROCEDURE — 37000009 HC ANESTHESIA EA ADD 15 MINS: Performed by: OBSTETRICS & GYNECOLOGY

## 2019-04-26 PROCEDURE — 51702 INSERT TEMP BLADDER CATH: CPT

## 2019-04-26 PROCEDURE — 86901 BLOOD TYPING SEROLOGIC RH(D): CPT

## 2019-04-26 PROCEDURE — 63600175 PHARM REV CODE 636 W HCPCS: Performed by: NURSE ANESTHETIST, CERTIFIED REGISTERED

## 2019-04-26 PROCEDURE — 80307 DRUG TEST PRSMV CHEM ANLYZR: CPT

## 2019-04-26 PROCEDURE — 59514 CESAREAN DELIVERY ONLY: CPT | Mod: CRNA,,, | Performed by: NURSE ANESTHETIST, CERTIFIED REGISTERED

## 2019-04-26 PROCEDURE — 59514 PRA REAN DELIVERY ONLY: ICD-10-PCS | Mod: CRNA,,, | Performed by: NURSE ANESTHETIST, CERTIFIED REGISTERED

## 2019-04-26 PROCEDURE — 25000003 PHARM REV CODE 250: Performed by: NURSE ANESTHETIST, CERTIFIED REGISTERED

## 2019-04-26 PROCEDURE — 27200918 HC ALEXIS O RING

## 2019-04-26 PROCEDURE — 36415 COLL VENOUS BLD VENIPUNCTURE: CPT

## 2019-04-26 PROCEDURE — 25000003 PHARM REV CODE 250: Performed by: ANESTHESIOLOGY

## 2019-04-26 PROCEDURE — 11000001 HC ACUTE MED/SURG PRIVATE ROOM

## 2019-04-26 PROCEDURE — 59514 PRA REAN DELIVERY ONLY: ICD-10-PCS | Mod: ANES,,, | Performed by: ANESTHESIOLOGY

## 2019-04-26 PROCEDURE — 86592 SYPHILIS TEST NON-TREP QUAL: CPT

## 2019-04-26 PROCEDURE — 37000008 HC ANESTHESIA 1ST 15 MINUTES: Performed by: OBSTETRICS & GYNECOLOGY

## 2019-04-26 PROCEDURE — S0028 INJECTION, FAMOTIDINE, 20 MG: HCPCS | Performed by: ANESTHESIOLOGY

## 2019-04-26 RX ORDER — FENTANYL CITRATE 50 UG/ML
INJECTION, SOLUTION INTRAMUSCULAR; INTRAVENOUS
Status: DISCONTINUED | OUTPATIENT
Start: 2019-04-26 | End: 2019-04-26

## 2019-04-26 RX ORDER — SODIUM CHLORIDE, SODIUM LACTATE, POTASSIUM CHLORIDE, CALCIUM CHLORIDE 600; 310; 30; 20 MG/100ML; MG/100ML; MG/100ML; MG/100ML
INJECTION, SOLUTION INTRAVENOUS CONTINUOUS
Status: ACTIVE | OUTPATIENT
Start: 2019-04-26 | End: 2019-04-26

## 2019-04-26 RX ORDER — BISACODYL 10 MG
10 SUPPOSITORY, RECTAL RECTAL ONCE AS NEEDED
Status: DISCONTINUED | OUTPATIENT
Start: 2019-04-26 | End: 2019-04-29 | Stop reason: HOSPADM

## 2019-04-26 RX ORDER — SODIUM CHLORIDE, SODIUM LACTATE, POTASSIUM CHLORIDE, CALCIUM CHLORIDE 600; 310; 30; 20 MG/100ML; MG/100ML; MG/100ML; MG/100ML
INJECTION, SOLUTION INTRAVENOUS CONTINUOUS PRN
Status: DISCONTINUED | OUTPATIENT
Start: 2019-04-26 | End: 2019-04-26

## 2019-04-26 RX ORDER — HYDROCORTISONE 25 MG/G
CREAM TOPICAL 3 TIMES DAILY PRN
Status: DISCONTINUED | OUTPATIENT
Start: 2019-04-26 | End: 2019-04-29 | Stop reason: HOSPADM

## 2019-04-26 RX ORDER — NALOXONE HCL 0.4 MG/ML
0.02 VIAL (ML) INJECTION
Status: DISCONTINUED | OUTPATIENT
Start: 2019-04-26 | End: 2019-04-29 | Stop reason: HOSPADM

## 2019-04-26 RX ORDER — SODIUM CHLORIDE, SODIUM LACTATE, POTASSIUM CHLORIDE, CALCIUM CHLORIDE 600; 310; 30; 20 MG/100ML; MG/100ML; MG/100ML; MG/100ML
INJECTION, SOLUTION INTRAVENOUS CONTINUOUS
Status: DISCONTINUED | OUTPATIENT
Start: 2019-04-26 | End: 2019-04-26

## 2019-04-26 RX ORDER — OXYCODONE AND ACETAMINOPHEN 5; 325 MG/1; MG/1
1 TABLET ORAL EVERY 4 HOURS PRN
Status: DISCONTINUED | OUTPATIENT
Start: 2019-04-26 | End: 2019-04-29 | Stop reason: HOSPADM

## 2019-04-26 RX ORDER — OXYCODONE AND ACETAMINOPHEN 10; 325 MG/1; MG/1
1 TABLET ORAL EVERY 4 HOURS PRN
Status: DISCONTINUED | OUTPATIENT
Start: 2019-04-26 | End: 2019-04-29 | Stop reason: HOSPADM

## 2019-04-26 RX ORDER — HYDROMORPHONE HCL IN 0.9% NACL 6 MG/30 ML
PATIENT CONTROLLED ANALGESIA SYRINGE INTRAVENOUS CONTINUOUS
Status: DISCONTINUED | OUTPATIENT
Start: 2019-04-26 | End: 2019-04-29 | Stop reason: HOSPADM

## 2019-04-26 RX ORDER — KETOROLAC TROMETHAMINE 30 MG/ML
30 INJECTION, SOLUTION INTRAMUSCULAR; INTRAVENOUS EVERY 6 HOURS
Status: COMPLETED | OUTPATIENT
Start: 2019-04-26 | End: 2019-04-27

## 2019-04-26 RX ORDER — MIDAZOLAM HYDROCHLORIDE 1 MG/ML
INJECTION INTRAMUSCULAR; INTRAVENOUS
Status: DISCONTINUED | OUTPATIENT
Start: 2019-04-26 | End: 2019-04-26

## 2019-04-26 RX ORDER — OXYTOCIN/RINGER'S LACTATE 20/1000 ML
41.7 PLASTIC BAG, INJECTION (ML) INTRAVENOUS CONTINUOUS
Status: DISCONTINUED | OUTPATIENT
Start: 2019-04-26 | End: 2019-04-26

## 2019-04-26 RX ORDER — MISOPROSTOL 200 UG/1
800 TABLET ORAL
Status: DISCONTINUED | OUTPATIENT
Start: 2019-04-26 | End: 2019-04-26

## 2019-04-26 RX ORDER — ONDANSETRON 8 MG/1
8 TABLET, ORALLY DISINTEGRATING ORAL EVERY 8 HOURS PRN
Status: DISCONTINUED | OUTPATIENT
Start: 2019-04-26 | End: 2019-04-29 | Stop reason: HOSPADM

## 2019-04-26 RX ORDER — SIMETHICONE 80 MG
1 TABLET,CHEWABLE ORAL EVERY 6 HOURS PRN
Status: DISCONTINUED | OUTPATIENT
Start: 2019-04-26 | End: 2019-04-29 | Stop reason: HOSPADM

## 2019-04-26 RX ORDER — OXYTOCIN/RINGER'S LACTATE 20/1000 ML
41.65 PLASTIC BAG, INJECTION (ML) INTRAVENOUS CONTINUOUS
Status: DISPENSED | OUTPATIENT
Start: 2019-04-26 | End: 2019-04-26

## 2019-04-26 RX ORDER — MUPIROCIN 20 MG/G
OINTMENT TOPICAL
Status: DISCONTINUED | OUTPATIENT
Start: 2019-04-26 | End: 2019-04-26

## 2019-04-26 RX ORDER — PHENYLEPHRINE HYDROCHLORIDE 10 MG/ML
INJECTION INTRAVENOUS
Status: DISCONTINUED | OUTPATIENT
Start: 2019-04-26 | End: 2019-04-26

## 2019-04-26 RX ORDER — OXYTOCIN 10 [USP'U]/ML
INJECTION, SOLUTION INTRAMUSCULAR; INTRAVENOUS
Status: DISCONTINUED | OUTPATIENT
Start: 2019-04-26 | End: 2019-04-26

## 2019-04-26 RX ORDER — BUPIVACAINE HYDROCHLORIDE 7.5 MG/ML
INJECTION, SOLUTION INTRASPINAL
Status: DISCONTINUED | OUTPATIENT
Start: 2019-04-26 | End: 2019-04-26

## 2019-04-26 RX ORDER — MUPIROCIN 20 MG/G
1 OINTMENT TOPICAL 2 TIMES DAILY
Status: DISCONTINUED | OUTPATIENT
Start: 2019-04-26 | End: 2019-04-29 | Stop reason: HOSPADM

## 2019-04-26 RX ORDER — SODIUM CITRATE AND CITRIC ACID MONOHYDRATE 334; 500 MG/5ML; MG/5ML
30 SOLUTION ORAL
Status: DISCONTINUED | OUTPATIENT
Start: 2019-04-26 | End: 2019-04-26

## 2019-04-26 RX ORDER — AMOXICILLIN 250 MG
1 CAPSULE ORAL NIGHTLY PRN
Status: DISCONTINUED | OUTPATIENT
Start: 2019-04-26 | End: 2019-04-29 | Stop reason: HOSPADM

## 2019-04-26 RX ORDER — DIPHENHYDRAMINE HCL 25 MG
25 CAPSULE ORAL EVERY 4 HOURS PRN
Status: DISCONTINUED | OUTPATIENT
Start: 2019-04-26 | End: 2019-04-29 | Stop reason: HOSPADM

## 2019-04-26 RX ORDER — OXYTOCIN/RINGER'S LACTATE 20/1000 ML
333 PLASTIC BAG, INJECTION (ML) INTRAVENOUS CONTINUOUS
Status: DISCONTINUED | OUTPATIENT
Start: 2019-04-26 | End: 2019-04-26

## 2019-04-26 RX ORDER — SODIUM CITRATE AND CITRIC ACID MONOHYDRATE 334; 500 MG/5ML; MG/5ML
30 SOLUTION ORAL ONCE
Status: COMPLETED | OUTPATIENT
Start: 2019-04-26 | End: 2019-04-26

## 2019-04-26 RX ORDER — DOCUSATE SODIUM 100 MG/1
200 CAPSULE, LIQUID FILLED ORAL 2 TIMES DAILY
Status: DISCONTINUED | OUTPATIENT
Start: 2019-04-26 | End: 2019-04-29 | Stop reason: HOSPADM

## 2019-04-26 RX ORDER — GLYCOPYRROLATE 0.2 MG/ML
INJECTION INTRAMUSCULAR; INTRAVENOUS
Status: DISCONTINUED | OUTPATIENT
Start: 2019-04-26 | End: 2019-04-26

## 2019-04-26 RX ORDER — ADHESIVE BANDAGE
30 BANDAGE TOPICAL 2 TIMES DAILY PRN
Status: DISCONTINUED | OUTPATIENT
Start: 2019-04-27 | End: 2019-04-29 | Stop reason: HOSPADM

## 2019-04-26 RX ORDER — PROPOFOL 10 MG/ML
VIAL (ML) INTRAVENOUS
Status: DISCONTINUED | OUTPATIENT
Start: 2019-04-26 | End: 2019-04-26

## 2019-04-26 RX ORDER — IBUPROFEN 400 MG/1
800 TABLET ORAL EVERY 8 HOURS
Status: DISCONTINUED | OUTPATIENT
Start: 2019-04-27 | End: 2019-04-29 | Stop reason: HOSPADM

## 2019-04-26 RX ORDER — METOCLOPRAMIDE HYDROCHLORIDE 5 MG/ML
10 INJECTION INTRAMUSCULAR; INTRAVENOUS ONCE
Status: COMPLETED | OUTPATIENT
Start: 2019-04-26 | End: 2019-04-26

## 2019-04-26 RX ORDER — ACETAMINOPHEN 10 MG/ML
INJECTION, SOLUTION INTRAVENOUS
Status: DISCONTINUED | OUTPATIENT
Start: 2019-04-26 | End: 2019-04-26

## 2019-04-26 RX ORDER — CEFAZOLIN SODIUM 2 G/50ML
2 SOLUTION INTRAVENOUS
Status: COMPLETED | OUTPATIENT
Start: 2019-04-26 | End: 2019-04-26

## 2019-04-26 RX ORDER — FAMOTIDINE 10 MG/ML
20 INJECTION INTRAVENOUS ONCE
Status: COMPLETED | OUTPATIENT
Start: 2019-04-26 | End: 2019-04-26

## 2019-04-26 RX ADMIN — SODIUM CHLORIDE, SODIUM LACTATE, POTASSIUM CHLORIDE, AND CALCIUM CHLORIDE: .6; .31; .03; .02 INJECTION, SOLUTION INTRAVENOUS at 08:04

## 2019-04-26 RX ADMIN — CEFAZOLIN SODIUM 2 G: 2 SOLUTION INTRAVENOUS at 07:04

## 2019-04-26 RX ADMIN — MUPIROCIN 1 G: 20 OINTMENT TOPICAL at 09:04

## 2019-04-26 RX ADMIN — PHENYLEPHRINE HYDROCHLORIDE 100 MCG: 10 INJECTION INTRAVENOUS at 07:04

## 2019-04-26 RX ADMIN — PROPOFOL 20 MG: 10 INJECTION, EMULSION INTRAVENOUS at 08:04

## 2019-04-26 RX ADMIN — ACETAMINOPHEN 1000 MG: 10 INJECTION, SOLUTION INTRAVENOUS at 08:04

## 2019-04-26 RX ADMIN — METOCLOPRAMIDE 10 MG: 5 INJECTION, SOLUTION INTRAMUSCULAR; INTRAVENOUS at 06:04

## 2019-04-26 RX ADMIN — Medication 41.65 MILLI-UNITS/MIN: at 09:04

## 2019-04-26 RX ADMIN — SODIUM CITRATE AND CITRIC ACID MONOHYDRATE 30 ML: 500; 334 SOLUTION ORAL at 06:04

## 2019-04-26 RX ADMIN — FAMOTIDINE 20 MG: 10 INJECTION INTRAVENOUS at 06:04

## 2019-04-26 RX ADMIN — OXYCODONE HYDROCHLORIDE AND ACETAMINOPHEN 1 TABLET: 5; 325 TABLET ORAL at 10:04

## 2019-04-26 RX ADMIN — DOCUSATE SODIUM 200 MG: 100 CAPSULE, LIQUID FILLED ORAL at 08:04

## 2019-04-26 RX ADMIN — KETOROLAC TROMETHAMINE 30 MG: 30 INJECTION, SOLUTION INTRAMUSCULAR at 05:04

## 2019-04-26 RX ADMIN — FENTANYL CITRATE 90 MCG: 50 INJECTION, SOLUTION INTRAMUSCULAR; INTRAVENOUS at 08:04

## 2019-04-26 RX ADMIN — MIDAZOLAM HYDROCHLORIDE 2 MG: 1 INJECTION, SOLUTION INTRAMUSCULAR; INTRAVENOUS at 08:04

## 2019-04-26 RX ADMIN — SODIUM CHLORIDE, SODIUM LACTATE, POTASSIUM CHLORIDE, AND CALCIUM CHLORIDE 1000 ML: .6; .31; .03; .02 INJECTION, SOLUTION INTRAVENOUS at 05:04

## 2019-04-26 RX ADMIN — SODIUM CHLORIDE, SODIUM LACTATE, POTASSIUM CHLORIDE, AND CALCIUM CHLORIDE 1000 ML: .6; .31; .03; .02 INJECTION, SOLUTION INTRAVENOUS at 06:04

## 2019-04-26 RX ADMIN — BUPIVACAINE HYDROCHLORIDE IN DEXTROSE 1.6 ML: 7.5 INJECTION, SOLUTION SUBARACHNOID at 07:04

## 2019-04-26 RX ADMIN — GLYCOPYRROLATE 0.1 MG: 0.2 INJECTION, SOLUTION INTRAMUSCULAR; INTRAVENOUS at 08:04

## 2019-04-26 RX ADMIN — MUPIROCIN 1 G: 20 OINTMENT TOPICAL at 08:04

## 2019-04-26 RX ADMIN — Medication: at 10:04

## 2019-04-26 RX ADMIN — PROMETHAZINE HYDROCHLORIDE 12.5 MG: 25 INJECTION INTRAMUSCULAR; INTRAVENOUS at 09:04

## 2019-04-26 RX ADMIN — OXYTOCIN 20 UNITS: 10 INJECTION, SOLUTION INTRAMUSCULAR; INTRAVENOUS at 08:04

## 2019-04-26 RX ADMIN — FENTANYL CITRATE 50 MCG: 50 INJECTION, SOLUTION INTRAMUSCULAR; INTRAVENOUS at 08:04

## 2019-04-26 RX ADMIN — FENTANYL CITRATE 10 MCG: 50 INJECTION, SOLUTION INTRAMUSCULAR; INTRAVENOUS at 07:04

## 2019-04-26 NOTE — TRANSFER OF CARE
"Anesthesia Transfer of Care Note    Patient: Gerda Wilkerson    Procedure(s) Performed: Procedure(s) (LRB):   SECTION, WITH TUBAL LIGATION (N/A)    Patient location: Labor and Delivery    Anesthesia Type: spinal    Transport from OR: Transported from OR on room air with adequate spontaneous ventilation    Post pain: adequate analgesia    Post assessment: no apparent anesthetic complications    Post vital signs: stable    Level of consciousness: awake, alert and oriented    Nausea/Vomiting: no nausea/vomiting    Complications: none    Transfer of care protocol was followed      Last vitals:   Visit Vitals  BP (!) 105/51 (BP Location: Right arm, Patient Position: Lying)   Pulse 71   Temp 36.7 °C (98 °F) (Oral)   Resp 18   Ht 5' 4" (1.626 m)   Wt 89.8 kg (198 lb)   Breastfeeding? No   BMI 33.99 kg/m²     "

## 2019-04-26 NOTE — LACTATION NOTE
"Mother seen in delivery -states wants to try breastfeeding but wants to do both and gave formula at this feeding because "not feeling up to trying"-reinforced benefits of skin to skin for baby breastfeeding -risks of formula and plan to try breastfeeding at next feeding -states okay   "

## 2019-04-26 NOTE — NURSING
Report received from ANDREW Garcia RN; care assumed. AAOx3, assessment completed. C/o lower abdominal cramping, rates pain 9/10 using pain scale. Pt takes tramadol for pain, states last dose was 2 days ago. Left hand #20g IV patent, infusing LR to gravity, site free of redness or swelling present. POC reviewed with pt, pt verb understanding. Call bell within reach, will monitor. NAD.

## 2019-04-26 NOTE — H&P
"History and Physical  Obstetrics      SUBJECTIVE:     Gerda Wilkerson is a 28 y.o.  female at 38w5d presents for repeat C/S     Patient Active Problem List   Diagnosis    Pregnancy with one fetus    Delivery with history of     Leiomyoma of uterus affecting pregnancy 3.2 x 1.9 x3.5 ant fibroid    Tobacco smoking affecting pregnancy in first trimester    Congenital heart defect - "leaky" heart valve    Positive urine drug screen - +UDS tramadol T2    Microcytic anemia    GBS (group B Streptococcus carrier), +RV culture, currently pregnant    Prenatal care, subsequent pregnancy, third trimester     Of note, pt saw Dr. Vazquez in January and had a normal echo with EF 65%.    PTA Medications   Medication Sig    ferrous sulfate (FEOSOL) 325 mg (65 mg iron) Tab tablet Take 1 tablet (325 mg total) by mouth once daily.    ferrous sulfate 325 (65 FE) MG EC tablet Take 1 tablet (325 mg total) by mouth 2 (two) times daily.    ferrous sulfate 325 (65 FE) MG EC tablet Take 1 tablet (325 mg total) by mouth 2 (two) times daily.    ondansetron (ZOFRAN) 4 MG tablet Take 1 tablet (4 mg total) by mouth every 6 (six) hours as needed for Nausea.    prenatal no115-iron-folic acid (PRENATAL 19) 29 mg iron- 1 mg Chew Take 1 tablet by mouth once daily.    promethazine (PHENERGAN) 25 MG tablet Take 1 tablet (25 mg total) by mouth every 6 (six) hours as needed (Headache or nausea).     Review of patient's allergies indicates:   Allergen Reactions    Hydroxyzine Swelling    Paroxetine Other (See Comments)     Lethargy      Past Medical History:   Diagnosis Date    Anxiety     Heart valve problem      Past Surgical History:   Procedure Laterality Date    CARDIAC SURGERY       SECTION       Family History   Problem Relation Age of Onset    Arrhythmia Neg Hx     Cardiomyopathy Neg Hx     Congenital heart disease Neg Hx     Early death Neg Hx     Heart attacks under age 50 Neg Hx     " Pacemaker/defibrilator Neg Hx      Social History     Tobacco Use    Smoking status: Current Some Day Smoker     Packs/day: 0.00     Types: Cigarettes    Smokeless tobacco: Never Used   Substance Use Topics    Alcohol use: Yes     Comment: occassionally    Drug use: No        OBJECTIVE:     Vital Signs (Most Recent)  [unfilled]    Physical Exam:  General:  Normal, alert and oriented   CV:  Regular rate   Lungs: Normal respiratory effort   Abd: Gravid, Nondistended, Nt,  No Guarding/rebounding   Extremeties: Nt, no significant assymetric swelling           Uterine Size:  c/w dates   Presentations:  vertex   FHT: reviewed   Pelvis: NEFG   SVE:  -/high     Laboratory:  @LABRCNTIP(wbc,hgb,hct,PLT)@  @LABRCNTIP(NA,K,CL,co2,bun,creatinine,calcium,labalbu,prot,bilitot,alkphos,alt,ast,glucose)@      ASSESSMENT/PLAN:     28 y.o. [id number] gestation   Prev C/S x 2  Tramadol/tobacco abuse  Ant fibroid  History of congenital heart disease with negative cardiology work up and normal fetal echo.

## 2019-04-27 LAB
ANISOCYTOSIS BLD QL SMEAR: ABNORMAL
BASOPHILS # BLD AUTO: 0.01 K/UL (ref 0–0.2)
BASOPHILS NFR BLD: 0.1 % (ref 0–1.9)
BURR CELLS BLD QL SMEAR: ABNORMAL
DACRYOCYTES BLD QL SMEAR: ABNORMAL
DIFFERENTIAL METHOD: ABNORMAL
EOSINOPHIL # BLD AUTO: 0.1 K/UL (ref 0–0.5)
EOSINOPHIL NFR BLD: 0.8 % (ref 0–8)
ERYTHROCYTE [DISTWIDTH] IN BLOOD BY AUTOMATED COUNT: 21.3 % (ref 11.5–14.5)
HCT VFR BLD AUTO: 21.7 % (ref 37–48.5)
HGB BLD-MCNC: 6 G/DL (ref 12–16)
HYPOCHROMIA BLD QL SMEAR: ABNORMAL
LYMPHOCYTES # BLD AUTO: 1.6 K/UL (ref 1–4.8)
LYMPHOCYTES NFR BLD: 14.1 % (ref 18–48)
MCH RBC QN AUTO: 14.5 PG (ref 27–31)
MCHC RBC AUTO-ENTMCNC: 27.6 G/DL (ref 32–36)
MCV RBC AUTO: 53 FL (ref 82–98)
MONOCYTES # BLD AUTO: 1.4 K/UL (ref 0.3–1)
MONOCYTES NFR BLD: 11.8 % (ref 4–15)
NEUTROPHILS # BLD AUTO: 8.4 K/UL (ref 1.8–7.7)
NEUTROPHILS NFR BLD: 73.6 % (ref 38–73)
OVALOCYTES BLD QL SMEAR: ABNORMAL
PLATELET # BLD AUTO: 287 K/UL (ref 150–350)
PLATELET BLD QL SMEAR: ABNORMAL
PMV BLD AUTO: ABNORMAL FL (ref 9.2–12.9)
POIKILOCYTOSIS BLD QL SMEAR: ABNORMAL
POLYCHROMASIA BLD QL SMEAR: ABNORMAL
RBC # BLD AUTO: 4.13 M/UL (ref 4–5.4)
SCHISTOCYTES BLD QL SMEAR: ABNORMAL
SCHISTOCYTES BLD QL SMEAR: PRESENT
TARGETS BLD QL SMEAR: ABNORMAL
WBC # BLD AUTO: 11.52 K/UL (ref 3.9–12.7)

## 2019-04-27 PROCEDURE — 85025 COMPLETE CBC W/AUTO DIFF WBC: CPT

## 2019-04-27 PROCEDURE — 11000001 HC ACUTE MED/SURG PRIVATE ROOM

## 2019-04-27 PROCEDURE — 63600175 PHARM REV CODE 636 W HCPCS: Performed by: OBSTETRICS & GYNECOLOGY

## 2019-04-27 PROCEDURE — 25000003 PHARM REV CODE 250: Performed by: OBSTETRICS & GYNECOLOGY

## 2019-04-27 PROCEDURE — 36415 COLL VENOUS BLD VENIPUNCTURE: CPT

## 2019-04-27 RX ADMIN — MUPIROCIN 1 G: 20 OINTMENT TOPICAL at 09:04

## 2019-04-27 RX ADMIN — OXYCODONE AND ACETAMINOPHEN 1 TABLET: 10; 325 TABLET ORAL at 11:04

## 2019-04-27 RX ADMIN — OXYCODONE HYDROCHLORIDE AND ACETAMINOPHEN 1 TABLET: 5; 325 TABLET ORAL at 05:04

## 2019-04-27 RX ADMIN — DOCUSATE SODIUM 200 MG: 100 CAPSULE, LIQUID FILLED ORAL at 09:04

## 2019-04-27 RX ADMIN — DOCUSATE SODIUM 200 MG: 100 CAPSULE, LIQUID FILLED ORAL at 11:04

## 2019-04-27 RX ADMIN — IBUPROFEN 800 MG: 400 TABLET, FILM COATED ORAL at 09:04

## 2019-04-27 RX ADMIN — KETOROLAC TROMETHAMINE 30 MG: 30 INJECTION, SOLUTION INTRAMUSCULAR at 05:04

## 2019-04-27 RX ADMIN — KETOROLAC TROMETHAMINE 30 MG: 30 INJECTION, SOLUTION INTRAMUSCULAR at 11:04

## 2019-04-27 RX ADMIN — OXYCODONE AND ACETAMINOPHEN 1 TABLET: 10; 325 TABLET ORAL at 09:04

## 2019-04-27 RX ADMIN — OXYCODONE HYDROCHLORIDE AND ACETAMINOPHEN 1 TABLET: 5; 325 TABLET ORAL at 01:04

## 2019-04-27 RX ADMIN — KETOROLAC TROMETHAMINE 30 MG: 30 INJECTION, SOLUTION INTRAMUSCULAR at 12:04

## 2019-04-27 NOTE — PLAN OF CARE
Problem: Adult Inpatient Plan of Care  Goal: Plan of Care Review  Pt sx dressing intact with no drainage present. Pt voids clear, yellow urine frequently. Lochia Marlena, clot and odor free.Flatus, but no BM during shift.    Pt understands and demonstrates proper hygiene/ infection control. Calm and accepting.

## 2019-04-28 LAB
ACANTHOCYTES BLD QL SMEAR: PRESENT
ANISOCYTOSIS BLD QL SMEAR: ABNORMAL
BASOPHILS # BLD AUTO: 0.01 K/UL (ref 0–0.2)
BASOPHILS NFR BLD: 0.1 % (ref 0–1.9)
BURR CELLS BLD QL SMEAR: ABNORMAL
DACRYOCYTES BLD QL SMEAR: ABNORMAL
DIFFERENTIAL METHOD: ABNORMAL
EOSINOPHIL # BLD AUTO: 0.2 K/UL (ref 0–0.5)
EOSINOPHIL NFR BLD: 2 % (ref 0–8)
ERYTHROCYTE [DISTWIDTH] IN BLOOD BY AUTOMATED COUNT: 23.1 % (ref 11.5–14.5)
HCT VFR BLD AUTO: 22.1 % (ref 37–48.5)
HGB BLD-MCNC: 6.2 G/DL (ref 12–16)
HYPOCHROMIA BLD QL SMEAR: ABNORMAL
LYMPHOCYTES # BLD AUTO: 1 K/UL (ref 1–4.8)
LYMPHOCYTES NFR BLD: 9.5 % (ref 18–48)
MCH RBC QN AUTO: 14.4 PG (ref 27–31)
MCHC RBC AUTO-ENTMCNC: 28.1 G/DL (ref 32–36)
MCV RBC AUTO: 51 FL (ref 82–98)
MONOCYTES # BLD AUTO: 0.8 K/UL (ref 0.3–1)
MONOCYTES NFR BLD: 8 % (ref 4–15)
NEUTROPHILS # BLD AUTO: 8.1 K/UL (ref 1.8–7.7)
NEUTROPHILS NFR BLD: 80.4 % (ref 38–73)
OVALOCYTES BLD QL SMEAR: ABNORMAL
PLATELET # BLD AUTO: 323 K/UL (ref 150–350)
PLATELET BLD QL SMEAR: ABNORMAL
PMV BLD AUTO: ABNORMAL FL (ref 9.2–12.9)
POIKILOCYTOSIS BLD QL SMEAR: ABNORMAL
POLYCHROMASIA BLD QL SMEAR: ABNORMAL
RBC # BLD AUTO: 4.3 M/UL (ref 4–5.4)
SCHISTOCYTES BLD QL SMEAR: ABNORMAL
SCHISTOCYTES BLD QL SMEAR: PRESENT
TARGETS BLD QL SMEAR: ABNORMAL
WBC # BLD AUTO: 10.15 K/UL (ref 3.9–12.7)

## 2019-04-28 PROCEDURE — 25000003 PHARM REV CODE 250: Performed by: OBSTETRICS & GYNECOLOGY

## 2019-04-28 PROCEDURE — 85025 COMPLETE CBC W/AUTO DIFF WBC: CPT

## 2019-04-28 PROCEDURE — 11000001 HC ACUTE MED/SURG PRIVATE ROOM

## 2019-04-28 PROCEDURE — 36415 COLL VENOUS BLD VENIPUNCTURE: CPT

## 2019-04-28 RX ADMIN — OXYCODONE AND ACETAMINOPHEN 1 TABLET: 10; 325 TABLET ORAL at 09:04

## 2019-04-28 RX ADMIN — DOCUSATE SODIUM 200 MG: 100 CAPSULE, LIQUID FILLED ORAL at 09:04

## 2019-04-28 RX ADMIN — OXYCODONE AND ACETAMINOPHEN 1 TABLET: 10; 325 TABLET ORAL at 04:04

## 2019-04-28 RX ADMIN — IBUPROFEN 800 MG: 400 TABLET, FILM COATED ORAL at 03:04

## 2019-04-28 RX ADMIN — IBUPROFEN 800 MG: 400 TABLET, FILM COATED ORAL at 06:04

## 2019-04-28 RX ADMIN — IBUPROFEN 800 MG: 400 TABLET, FILM COATED ORAL at 09:04

## 2019-04-28 RX ADMIN — OXYCODONE AND ACETAMINOPHEN 1 TABLET: 10; 325 TABLET ORAL at 05:04

## 2019-04-28 RX ADMIN — MUPIROCIN 1 G: 20 OINTMENT TOPICAL at 09:04

## 2019-04-28 RX ADMIN — SIMETHICONE CHEW TAB 80 MG 80 MG: 80 TABLET ORAL at 04:04

## 2019-04-28 RX ADMIN — OXYCODONE AND ACETAMINOPHEN 1 TABLET: 10; 325 TABLET ORAL at 10:04

## 2019-04-28 RX ADMIN — DOCUSATE SODIUM 200 MG: 100 CAPSULE, LIQUID FILLED ORAL at 10:04

## 2019-04-28 NOTE — LACTATION NOTE
"   04/28/19 0940   Pain/Comfort/Sleep   Pain Body Location - Side Bilateral   Pain Body Location breast   Pain Rating (0-10): Activity 0   Breasts WDL   Breast WDL WDL   Maternal Feeding Assessment   Maternal Emotional State relaxed;assist needed   Signs of Milk Transfer audible swallow;infant jaw motion present   Infant Positioning clutch/football   Latch Assistance yes   Reproductive Interventions   Breastfeeding Assistance assisted with positioning;infant latch-on verified;infant suck/swallow verified   Breastfeeding Support encouragement provided;lactation counseling provided     Minimal assist with position and latch to right breast in football hold; audible swallows noted.  Originally formula feeding, now breasts engorged and would like to initiate breastfeeding.   Basic breastfeeding instructions given and Mother's Breastfeeding Guide reviewed.  Encouraged to call for assist prn.  States "understand" and verbalized appropriate recall.    "

## 2019-04-28 NOTE — ANESTHESIA POSTPROCEDURE EVALUATION
Anesthesia Post Evaluation    Patient: Gerda Wilkerson    Procedure(s) Performed: Procedure(s) (LRB):   SECTION, WITH TUBAL LIGATION (N/A)    Final Anesthesia Type: spinal  Patient location during evaluation: labor & delivery  Patient participation: Yes- Able to Participate  Level of consciousness: awake and alert  Post-procedure vital signs: reviewed and stable  Pain management: adequate  Airway patency: patent  PONV status at discharge: No PONV  Anesthetic complications: no      Cardiovascular status: blood pressure returned to baseline and hemodynamically stable  Respiratory status: unassisted and spontaneous ventilation  Hydration status: euvolemic  Follow-up not needed.          Vitals Value Taken Time   /66 2019  4:08 AM   Temp 36.1 °C (97 °F) 2019  4:08 AM   Pulse 107 2019  4:08 AM   Resp 20 2019  4:08 AM   SpO2 100 % 2019  7:46 PM         No case tracking events are documented in the log.      Pain/Eddie Score: Pain Rating Prior to Med Admin: 5 (2019  6:14 AM)  Pain Rating Post Med Admin: 5 (2019 10:34 PM)

## 2019-04-28 NOTE — PLAN OF CARE
Medicated for pain with 10 mg per patient's request, instructed re s/e med administration, safety, instructed to call nurse for any concerns... Patient speaking of ambulating later, patient instructed to not leave baby alone in room. Verb understanding.

## 2019-04-28 NOTE — PLAN OF CARE
To room, patient's family member in bed, baby in crib at bedside..Patient's family member states patient went for a walk to help with her gas.

## 2019-04-28 NOTE — PLAN OF CARE
Problem: Adult Inpatient Plan of Care  Goal: Patient-Specific Goal (Individualization)  Outcome: Ongoing (interventions implemented as appropriate)  Pt. tolerating pain with scheduled motrin and prn percocet. Tolerating diet.  Enc. Pt. To ambulate today in hallway which she has. I&o today. poc reviewed with pt. Bonding well with infant. Vss. Pt. Started to breastfeed today. Dc home tomorrow.

## 2019-04-28 NOTE — PLAN OF CARE
Problem: Infection (Postpartum  Delivery)  Goal: Absence of Infection Signs/Symptoms  Outcome: Ongoing (interventions implemented as appropriate)  Patient showered early in the shift, instructed her on care of the incision, verbalized understanding, no steri-strips on incision, well approximated, clean and dry, no odor.

## 2019-04-28 NOTE — PLAN OF CARE
Problem: Adult Inpatient Plan of Care  Goal: Patient-Specific Goal (Individualization)  Outcome: Ongoing (interventions implemented as appropriate)  VSS, voiding without difficulty, ambulating in the room, complains of incisional pain and lower back pain at epidural site, well controlled with PO pain medication, infant is at mother's bedside and is being bottle/formula fed.

## 2019-04-29 VITALS
OXYGEN SATURATION: 100 % | DIASTOLIC BLOOD PRESSURE: 57 MMHG | TEMPERATURE: 98 F | WEIGHT: 198 LBS | RESPIRATION RATE: 20 BRPM | HEART RATE: 86 BPM | SYSTOLIC BLOOD PRESSURE: 104 MMHG | BODY MASS INDEX: 33.8 KG/M2 | HEIGHT: 64 IN

## 2019-04-29 PROCEDURE — 25000003 PHARM REV CODE 250: Performed by: OBSTETRICS & GYNECOLOGY

## 2019-04-29 RX ORDER — IBUPROFEN 800 MG/1
800 TABLET ORAL EVERY 8 HOURS PRN
Qty: 90 TABLET | Refills: 2 | Status: SHIPPED | OUTPATIENT
Start: 2019-04-29 | End: 2020-04-28

## 2019-04-29 RX ORDER — OXYCODONE AND ACETAMINOPHEN 5; 325 MG/1; MG/1
1 TABLET ORAL EVERY 4 HOURS PRN
Qty: 20 TABLET | Refills: 0 | Status: SHIPPED | OUTPATIENT
Start: 2019-04-29

## 2019-04-29 RX ADMIN — OXYCODONE HYDROCHLORIDE AND ACETAMINOPHEN 1 TABLET: 5; 325 TABLET ORAL at 08:04

## 2019-04-29 RX ADMIN — OXYCODONE AND ACETAMINOPHEN 1 TABLET: 10; 325 TABLET ORAL at 02:04

## 2019-04-29 RX ADMIN — DOCUSATE SODIUM 200 MG: 100 CAPSULE, LIQUID FILLED ORAL at 08:04

## 2019-04-29 RX ADMIN — IBUPROFEN 800 MG: 400 TABLET, FILM COATED ORAL at 05:04

## 2019-04-29 RX ADMIN — IBUPROFEN 800 MG: 400 TABLET, FILM COATED ORAL at 01:04

## 2019-04-29 RX ADMIN — MUPIROCIN 1 G: 20 OINTMENT TOPICAL at 08:04

## 2019-04-29 RX ADMIN — OXYCODONE AND ACETAMINOPHEN 1 TABLET: 10; 325 TABLET ORAL at 01:04

## 2019-04-29 NOTE — DISCHARGE INSTRUCTIONS
After a Cesearean Birth    General Discharge Instructions  · May follow a regular diet, unless otherwise discussed with physician.    · Take showers, not baths unless otherwise discussed with physician.    · Activity as tolerated.    · No lifting or heavy exercise for 6 weeks, no driving for 2 weeks, no sexual   intercourse, douching or tampons for 6 weeks    · May return to work/school as discussed with physician    · Discuss birth control with physician    · Take medications as directed    · Breast care support bra worn at all times    · Lactation consultant referral number ( [phone] or [phone])    Call Your Healthcare Provider Right Away If You Have:  · A temperature of 100.4°F or higher.  · If your blood pressure is over 155/105.  · You have difficulty catching your breath or trouble breathing.  · Heavy vaginal bleeding, clots, or vaginal discharge with foul odor. (heavier than menses)  · Persistent nausea or vomiting.  · You gain more than 3 pounds in 3 days.  · Severe headaches not relieved by Tylenol (acetaminophen) or Motrin (ibuprofen)  · Blurry or double vision, see spots or flashing lights.  · Dizziness or fainting.  · New onset swelling or worsening of existing swelling.  · Burning or pain when you urinate.  · No bowel movement for 5 days.  · Redness, warmth, swelling, or pain in the lower leg.  · Redness, discharge, or pain worse than you had in the hospital.  · Burning, pain, red streaks, or lumpy areas in your breasts.  · Cracks, blisters, or blood on your nipples.  · Feelings of extreme sadness or anxiety, or a feeling that you dont want to be with your baby.  · If you have any new or unusual symptoms or have questions or concerns    Some of these symptoms can occur up to 4 to 6 weeks after delivery. This can be a sign of pre-eclampsia, which is a serious disease that can cause stroke, seizures, organ damage, or death. Do not wait to call your doctor or seek medical  attention.          Incision Care  · If you have an Aquacel dressing, then it stays in place for 1 week. It is waterproof and will be removed at your post-op visit. If it comes off before then, call your physician and keep the incision clean with warm soapy water and pat dry.  · Watch your incision for signs of infection, such as increasing redness or drainage, or a foul smelling odor.  · For ease of movement, hold a pillow against the incision when you get up from a lying or sitting position, and when you laugh or cough.  · Avoid heavy lifting--nothing heavier than your baby until your doctor instructs you otherwise.     Follow-Up  Schedule a  follow-up exam with your healthcare provider for about 1 week after delivery. During this exam, your uterus and vaginal area will be checked. Contact your healthcare provider if you think you or your baby are having any problems.              Breastfeeding discharge instructions given with First Alert form and reviewed.  Also discussed:   AAP recommendation of exclusive breastfeeding for the first 6 months of life and continued breastfeeding with the introduction of supplemental foods beyond the first year of life.  Instructed on the recommendation to delay all bottle and pacifier use until after 4 weeks of age and breastfeeding is well established.  Discussed the benefits of exclusive breastfeeding for both mother and baby.  Discussed the risks of supplementation/pacifier use on the exclusivity of breastfeeding in the first 6 months. Feed the baby at the earliest sign of hunger or comfort  o Hands to mouth, sucking motions  o Rooting or searching for something to suck on  o Dont wait for crying - it is a not a late sign of hunger; it is a sign of distress     The feedings may be 8-12 times per 24hrs and will not follow a schedule   Alternate the breast you start the feeding with, or start with the breast that feels the fullest   Switch breasts when the  baby takes himself off the breast or falls asleep   Keep offering breasts until the baby looks full, no longer gives hunger signs, and stays asleep when placed on his back in the crib   If the baby is sleepy and wont wake for a feeding, put the baby skin-to-skin dressed in a diaper against the mothers bare chest   Sleep near your baby   The baby should be positioned and latched on to the breast correctly  o Chest-to-chest, chin in the breast  o Babys lips are flipped outward  o Babys mouth is stretched open wide like a shout  o Babys sucking should feel like tugging to the mother  - The baby should be drinking at the breast:  o You should hear swallowing or gulping throughout the feeding  o You should see milk on the babys lips when he comes off the breast  o Your breasts should be softer when the baby is finished feeding  o The baby should look relaxed at the end of feedings  o After the 4th day and your milk is in:  o The babys poop should turn bright yellow and be loose, watery, and seedy  o The baby should have at least 3-4 poops the size of the palm of your hand per day  o The baby should have at least 6-8 wet diapers per day  o The urine should be light yellow in color  You should drink when you are thirsty and eat a healthy diet when you are    hungry.     Take naps to get the rest you need.   Take medications and/or drink alcohol only with permission of your obstetrician    or the babys pediatrician.  You can also call the [facility],   ([phone]), Monday-Friday, 8am-5pm Central time, to get the most   up-to-date evidence-based information on the use of medications during   pregnancy and breastfeeding.      The baby should be examined by a pediatrician at 3-5 days of age; unless ordered sooner by the pediatrician.  Once your milk comes in, the baby should be back to birth weight no later than 10-14 days of age.

## 2019-04-29 NOTE — PHYSICIAN QUERY
"PT Name: Gerda Wilkerson  MR #: [id number]    Physician Query Form - Hematology Clarification      CDS/: CARLY Roman,[email or web address]-[email or web address]        Contact information:[email or web address]    This form is a permanent document in the medical record.      Query Date: 2019    By submitting this query, we are merely seeking further clarification of documentation. Please utilize your independent clinical judgment when addressing the question(s) below.    The Medical record contains the following:   Indicators  Supporting Clinical Findings Location in Medical Record   X "Anemia" documented Status post  section. Asymptomatic anemia    Patient Active Problem List  Microcytic anemia OB Progress note @948pm      H&P    X H & H = Hgb=6.0-7.5  Hct=21.7-27.0 LAB -    BP =                     HR=      "GI bleeding" documented     X Acute bleeding (Non GI site) Estimated Blood Loss:  550 cc L&D Delivery note     Transfusion(s)      Treatment:      Other:        Provider, please specify diagnosis or diagnoses associated with above clinical findings.    [  ] Acute blood loss anemia expected post-operatively   [  ] Acute blood loss anemia     [x  ] Other Hematological Diagnosis (please specify):     [  ] Clinically Undetermined       Please document in your progress notes daily for the duration of treatment, until resolved, and include in your discharge summary.                                                                                                      "

## 2019-04-29 NOTE — PLAN OF CARE
Problem: Adult Inpatient Plan of Care  Goal: Plan of Care Review  Outcome: Ongoing (interventions implemented as appropriate)  Plan of care reviewed with mother. All questions and concerns addressed. Pt maintaining adequate pain control with PO pain meds. Mother and infant bonding well.  Bottle feeding.  Encouraged pt to increase ambulation.  Incision care reviewed with pt.  Exam WNL

## 2019-04-29 NOTE — LACTATION NOTE
04/29/19 0740   Maternal Assessment   Breast Density Bilateral:;engorged   Areola Bilateral:;dense   Nipples Bilateral:;everted   Maternal Infant Feeding   Maternal Emotional State assist needed   Infant Positioning cradle;cross-cradle   Signs of Milk Transfer audible swallow;infant jaw motion present;breasts soften with feeding   Latch Assistance yes   mother with baby asking for formula -breasts are full and hard this AM -encouraged baby at breast but states baby would not latch -assistance given and mother with hand expression and massage able to soften enough for latch -baby with strong sucking and swallows noted -mother denies discomfort with feeding now -ready for discharge today-reinforced avoid formula and pacifiers for easier latch and breast emptying

## 2019-04-29 NOTE — LACTATION NOTE
Mother with baby awake and sucking on pacifier -reinforced risks of pacifier and we need baby at breast to empty them as breasts are full  -assistance given to latch again -mother hand expressing to soften prior to latch -review breastfeeding discharge information with mother now -aware of need to monitor wet and dirty diapers over next few days -reinforced engorgement measures and given Calypso pump for home use -taught use of pump and able to soften breasts now-review collection and storage of milk and cleaning of parts-referred to breastfeeding guide for community resources and referred to medicaid plan and WIC for electric pump -states understanding of all information and all questions answered

## 2019-04-29 NOTE — PLAN OF CARE
Problem: Breastfeeding  Goal: Effective Breastfeeding  Outcome: Outcome(s) achieved Date Met: 04/29/19  Plan breastfeed on demand at least 8 times in 24 hours and monitor output over next few days

## 2019-04-29 NOTE — DISCHARGE SUMMARY
28 y.o.   OB History        3    Para   3    Term   3            AB        Living   3       SAB        TAB        Ectopic        Multiple   0    Live Births   1               admitted for Delivery. See procedure note. Postpartum course was unremarkable.     Admit date 2019  5:00 AM  D/c date 2019    Disposition: Home  Activity 6 weeks pelvic rest  Diet: normal  Discharge followup 1 week, if . Follow up in 6 weeks if vaginal delivery  Meds listed separately

## 2019-04-29 NOTE — PROGRESS NOTES
Delivery Progress Note  Obstetrics        SUBJECTIVE:     Postpartum Day 1:  Delivery    Ms. Wilkerson states she feels well. She denies emotional concerns. Her pain is well controlled with current medications. The baby is well. The patient is ambulating well. Ms. Wilkerson is tolerating a normal diet. Flatus has been passed. Urinary output is adequate.    OBJECTIVE:     Vital Signs (Most Recent):  Temp: 96.9 °F (36.1 °C) (19)  Pulse: 85 (19)  Resp: 20 (19)  BP: 131/70 (19)  SpO2: 100 % (19)    Vital Signs Range (Last 24H):  Temp:  [96.9 °F (36.1 °C)-100.5 °F (38.1 °C)]   Pulse:  [79-95]   Resp:  [18-20]   BP: (105-134)/(54-70)   SpO2:  [98 %-100 %]     I & O (Last 24H):    Intake/Output Summary (Last 24 hours) at 2019  Last data filed at 2019 1654  Gross per 24 hour   Intake 2140 ml   Output 950 ml   Net 1190 ml     Physical Exam:  General:    no distress   Lungs:  clear to auscultation bilaterally   Heart:  regular rate and rhythm, S1, S2 normal, no murmur, click, rub or gallop   Breasts:  no discharge, erythema, or tenderness   Abdomen:  soft, non-tender; bowel sounds normal   Fundus:  firm   Incision:  healing well   Lochia:   scant rubra   DVT Evaluation:  No evidence of DVT on either side seen on physical exam.     Hemoglobin/Hematocrit  Recent Labs   Lab 19  0617   HGB 6.0*   HCT 21.7*     ABO/Rh  Lab Results   Component Value Date    [facility]     Rubella  No results for input(s): RUBELLAIGGSC in the last 168 hours.    ASSESSMENT/PLAN:     Status post  section. Asymptomatic anemia. Doing well postoperatively.     Continue current care.  
"PPD# 2.   Doing well, no complaints. Ambulating, voiding, tolerating regular diet, pain is controlled.    Patient Active Problem List   Diagnosis    Pregnancy with one fetus    Delivery with history of     Leiomyoma of uterus affecting pregnancy 3.2 x 1.9 x3.5 ant fibroid    Tobacco smoking affecting pregnancy in first trimester    Congenital heart defect - "leaky" heart valve    Positive urine drug screen - +UDS tramadol T2    Microcytic anemia    GBS (group B Streptococcus carrier), +RV culture, currently pregnant    Prenatal care, subsequent pregnancy, third trimester       Temp:  [96.9 °F (36.1 °C)-98.3 °F (36.8 °C)] 97.2 °F (36.2 °C)  Pulse:  [] 78  Resp:  [20] 20  SpO2:  [100 %] 100 %  BP: (105-134)/(54-70) 121/57  Alert and oriented  Lungs: Normal respiratory effort.  Cv: RRR  Abd soft fundus firm and nontender  Ext: No significant asymmetric edema, no calf tenderness.  Incision C/D/I    Recent Labs   Lab 19  0540  19  0804   WBC 6.29   < > 10.15   HGB 7.5*   < > 6.2*   HCT 27.0*   < > 22.1*   *   < > 323   GROUPTRH B POS  --   --     < > = values in this interval not displayed.       A&P  28 y.o.  Post partum, doing well  Routine postpartum care      "
Care assumed. Previous assessment agreed with.   
Chart check completed  
Iv removed, catheter intact, patient up to shower.  
PCA discontinued. 10ml remained in syringe. RN will witness waste of hydromorphone  10ml with charge nurse, pop.   
Pt ambulated to and from restroom. Gait steady.   
Pt complain of abd pain 6/10. PRN percocet administered. RN will continue to monitor and follow up.   
Pt currently passing gas. Tolerates clear diet. Advanced to full liquid diet. RN will continue to monitor.   
Pt dc'd home per MD order. Dc instructions given including medication Rx, and f/u appt. Pt denies any questions or concerns and verbalized understanding. Pt to leave floor via wheelchair.    
Pt valle removed. Pt due to void. PCA discontinued. Fall risk reviewed. Educated to call for assistance prior to getting out of bed. Pt educated to report all needs and changes.   
Pt void clear, yellow urine. Urine clot free with scant blood noted. Odor free. continuous fluids discontinued. Post void.  
Pt void, urine without blood clots and odor free.   
To room patient sitting up in bed preparing to eat breakfast, lab anaya cbc, low H and H discussed at length, patient denies any sob, dizziness, difficulty in ambulation or any other problems.... States she has always been anemic during this entire pregnancy... Pain meds offered, does not want at this time. Instructed to call nurse if she needs medication... Verb understanding.  
No

## 2019-04-29 NOTE — OP NOTE
"Postop day 3  Patient doing well, no complaints. Ambulating. Lochia minimal,  denies n/v, denies h/a, vision changes, upper abd pain, chest pain, sob. Pain is controlled. Tolerating diet, PASSING flatus    Patient Active Problem List   Diagnosis    Pregnancy with one fetus    Delivery with history of     Leiomyoma of uterus affecting pregnancy 3.2 x 1.9 x3.5 ant fibroid    Tobacco smoking affecting pregnancy in first trimester    Congenital heart defect - "leaky" heart valve    Positive urine drug screen - +UDS tramadol T2    Microcytic anemia    GBS (group B Streptococcus carrier), +RV culture, currently pregnant    Prenatal care, subsequent pregnancy, third trimester       Temp:  [97.5 °F (36.4 °C)-98.4 °F (36.9 °C)] 97.5 °F (36.4 °C)  Pulse:  [79-97] 79  Resp:  [18-20] 20  SpO2:  [98 %-100 %] 100 %  BP: (110-123)/(53-60) 115/55  Gen: Alert, orientated  CV: Regular rate  Normal resp effort  Abd: soft nondistended fundus firm and appropriately tender  Incision: clean, dry, intact; No erythema  Ext: no significant edema; nontender calves    Recent Labs   Lab 19  0540  19  0804   WBC 6.29   < > 10.15   HGB 7.5*   < > 6.2*   HCT 27.0*   < > 22.1*   *   < > 323   GROUPTRH B POS  --   --     < > = values in this interval not displayed.       A&P  28 y.o.  s/p c/d  Post op doing well.  Routine post op care.  Anemia- recommended to continue iron. Asymptomatic    "

## 2019-04-30 NOTE — ANESTHESIA PREPROCEDURE EVALUATION
04/30/2019  Gerda Wilkerson is a 29 y.o., female.    Anesthesia Evaluation     I have reviewed the Nursing Notes.      Review of Systems  Anesthesia Hx:  No problems with previous Anesthesia   Social:  Smoker    Cardiovascular:  Cardiovascular Normal Exercise tolerance: good     Pulmonary:  Pulmonary Normal    Renal/:  Renal/ Normal     Hepatic/GI:   No Bowel Prep. Denies PUD. Denies Liver Disease. Denies Hepatitis.    Neurological:  Neurology Normal    Endocrine:  Endocrine Normal        Physical Exam  General:  Obesity    Airway/Jaw/Neck:  AIRWAY FINDINGS: Normal      Chest/Lungs:  Chest/Lungs Clear    Heart/Vascular:  Heart Findings: Normal       Mental Status:  Mental Status Findings: Normal        Anesthesia Plan  Type of Anesthesia, risks & benefits discussed:  Anesthesia Type:  spinal  Patient's Preference:   Intra-op Monitoring Plan: standard ASA monitors  Intra-op Monitoring Plan Comments:   Post Op Pain Control Plan:   Post Op Pain Control Plan Comments:   Induction:    Beta Blocker:  Patient is not currently on a Beta-Blocker (No further documentation required).       Informed Consent: Patient understands risks and agrees with Anesthesia plan.  Questions answered. Anesthesia consent signed with patient.  ASA Score: 2     Day of Surgery Review of History & Physical:  There are no significant changes.  H&P update referred to the provider.         Ready For Surgery From Anesthesia Perspective.

## 2019-04-30 NOTE — ANESTHESIA PROCEDURE NOTES
Spinal    Diagnosis: intrauterine pregnancy  Patient location during procedure: OR  Start time: 4/26/2019 7:38 AM  Timeout: 4/26/2019 7:38 AM  End time: 4/26/2019 7:44 AM  Staffing  Anesthesiologist: Roscoe Burroughs MD  Performed: anesthesiologist   Preanesthetic Checklist  Completed: patient identified, surgical consent, pre-op evaluation, timeout performed, IV checked, risks and benefits discussed and monitors and equipment checked  Spinal Block  Patient position: sitting  Prep: ChloraPrep  Patient monitoring: heart rate, cardiac monitor, continuous pulse ox and frequent blood pressure checks  Location: L4-5  Injection technique: single shot  CSF Fluid: clear free-flowing CSF  Needle  Needle type: pencan.   Needle gauge: 25 G  Needle length: 3.5 in  Additional Documentation: negative aspiration for heme and no paresthesia on injection  Needle localization: anatomical landmarks  Assessment  Ease of block: easy  Patient's tolerance of the procedure: comfortable throughout block and no complaints

## 2019-05-01 ENCOUNTER — TELEPHONE (OUTPATIENT)
Dept: OBSTETRICS AND GYNECOLOGY | Facility: HOSPITAL | Age: 29
End: 2019-05-01

## 2019-05-01 NOTE — TELEPHONE ENCOUNTER
Spoke to pt who states baby breastfeeding well -still latching well and denies any problems with breasts -having many wet and dirty diapers and stools are yellow colored now -has appt with pediatrician on Friday and has no concerns

## 2019-06-13 PROBLEM — O99.820 GBS (GROUP B STREPTOCOCCUS CARRIER), +RV CULTURE, CURRENTLY PREGNANT: Status: RESOLVED | Noted: 2019-04-05 | Resolved: 2019-06-13

## 2019-06-13 PROBLEM — D50.9 MICROCYTIC ANEMIA: Status: RESOLVED | Noted: 2019-04-05 | Resolved: 2019-06-13

## 2020-02-28 ENCOUNTER — HOSPITAL ENCOUNTER (EMERGENCY)
Facility: HOSPITAL | Age: 30
Discharge: HOME | End: 2020-02-28
Attending: EMERGENCY MEDICINE

## 2020-02-28 VITALS
HEART RATE: 105 BPM | DIASTOLIC BLOOD PRESSURE: 71 MMHG | HEIGHT: 64 IN | RESPIRATION RATE: 18 BRPM | BODY MASS INDEX: 29.88 KG/M2 | OXYGEN SATURATION: 100 % | TEMPERATURE: 98 F | SYSTOLIC BLOOD PRESSURE: 133 MMHG | WEIGHT: 175 LBS

## 2020-02-28 DIAGNOSIS — L25.9 CONTACT DERMATITIS, UNSPECIFIED CONTACT DERMATITIS TYPE, UNSPECIFIED TRIGGER: Primary | ICD-10-CM

## 2020-02-28 LAB
B-HCG UR QL: NEGATIVE
CTP QC/QA: YES

## 2020-02-28 PROCEDURE — 99283 EMERGENCY DEPT VISIT LOW MDM: CPT

## 2020-02-28 PROCEDURE — 63600175 PHARM REV CODE 636 W HCPCS: Performed by: EMERGENCY MEDICINE

## 2020-02-28 PROCEDURE — 81025 URINE PREGNANCY TEST: CPT | Performed by: PHYSICIAN ASSISTANT

## 2020-02-28 PROCEDURE — 25000003 PHARM REV CODE 250: Performed by: EMERGENCY MEDICINE

## 2020-02-28 RX ORDER — DIPHENHYDRAMINE HCL 25 MG
25 CAPSULE ORAL
Status: COMPLETED | OUTPATIENT
Start: 2020-02-28 | End: 2020-02-28

## 2020-02-28 RX ORDER — PREDNISONE 20 MG/1
40 TABLET ORAL
Status: COMPLETED | OUTPATIENT
Start: 2020-02-28 | End: 2020-02-28

## 2020-02-28 RX ORDER — PREDNISONE 20 MG/1
20 TABLET ORAL DAILY
Qty: 10 TABLET | Refills: 0 | Status: SHIPPED | OUTPATIENT
Start: 2020-02-28 | End: 2020-03-04

## 2020-02-28 RX ADMIN — DIPHENHYDRAMINE HYDROCHLORIDE 25 MG: 25 CAPSULE ORAL at 02:02

## 2020-02-28 RX ADMIN — PREDNISONE 40 MG: 20 TABLET ORAL at 02:02

## 2020-02-28 NOTE — ED PROVIDER NOTES
"Encounter Date: 2020    SCRIBE #1 NOTE: I, Tyshawn Frederick, am scribing for, and in the presence of,  Ilia Marrero MD. I have scribed the following portions of the note - Other sections scribed: HPI/ROS/PE.       History     Chief Complaint   Patient presents with    Allergic Reaction     "I'm itchy everywhere" after using a new hair dye yesterday; denies difficulty swallowing     This 29 y.o. female with no pertinent medical history presents to the ED for an emergent evaluation of an itchy rash to the face, forehead, posterior neck, and BUEs. Pt reports she used a new hair dye last night and onset of symptoms began shortly after. Pt reports increased itching after washing hair approximately 2.5 hours PTA, which prompted ED visit. No alleviating factors. No prior OTC tx attempted. Otherwise, pt denies fever, chills, periorbital swelling, tongue swelling, trouble swallowing, SOB, speech difficulty, and any other associated symptoms. No further complaints at this time.    The history is provided by the patient. No  was used.     Review of patient's allergies indicates:   Allergen Reactions    Hydroxyzine Swelling    Paroxetine Other (See Comments)     Lethargy     Past Medical History:   Diagnosis Date    Anxiety     Heart valve problem      Past Surgical History:   Procedure Laterality Date    CARDIAC SURGERY       SECTION       SECTION WITH TUBAL LIGATION N/A 2019    Procedure:  SECTION, WITH TUBAL LIGATION;  Surgeon: Kitty Graham MD;  Location: [facility] L[facility] OR;  Service: OB/GYN;  Laterality: N/A;     Family History   Problem Relation Age of Onset    Arrhythmia Neg Hx     Cardiomyopathy Neg Hx     Congenital heart disease Neg Hx     Early death Neg Hx     Heart attacks under age 50 Neg Hx     Pacemaker/defibrilator Neg Hx      Social History     Tobacco Use    Smoking status: Current Some Day Smoker     Packs/day: 0.00     Types: " Cigarettes    Smokeless tobacco: Never Used   Substance Use Topics    Alcohol use: Yes     Comment: occassionally    Drug use: No     Review of Systems   Constitutional: Negative for chills, diaphoresis and fever.   HENT: Negative for congestion, sore throat, trouble swallowing and voice change.    Eyes: Negative for itching and visual disturbance.   Respiratory: Negative for cough and shortness of breath.    Cardiovascular: Negative for chest pain.   Gastrointestinal: Negative for abdominal pain, diarrhea, nausea and vomiting.        No melena.   Skin: Positive for rash. Negative for wound.   All other systems reviewed and are negative.      Physical Exam     Initial Vitals [02/28/20 0202]   BP Pulse Resp Temp SpO2   127/76 100 18 98.4 °F (36.9 °C) 100 %      MAP       --         Physical Exam    Nursing note and vitals reviewed.  Constitutional: She appears well-developed and well-nourished. She is not diaphoretic. No distress.   HENT:   Head: Normocephalic and atraumatic.   Right Ear: External ear normal.   Left Ear: External ear normal.   Nose: Nose normal.   Mouth/Throat: Oropharynx is clear and moist.   Eyes: Conjunctivae and EOM are normal. Pupils are equal, round, and reactive to light. Right eye exhibits no discharge. Left eye exhibits no discharge. No scleral icterus.   Neck: Normal range of motion. Neck supple. No thyromegaly present.   Cardiovascular: Normal rate, regular rhythm and normal heart sounds.   No murmur heard.  Pulmonary/Chest: Breath sounds normal. No stridor. No respiratory distress. She has no wheezes. She has no rhonchi. She has no rales.   Musculoskeletal: Normal range of motion.   Neurological: She is alert and oriented to person, place, and time.   Skin: Skin is warm and dry.   Papular rash to the face, forehead, and posterior neck.    Psychiatric: She has a normal mood and affect. Her behavior is normal. Judgment and thought content normal.         ED Course   Procedures  Labs  Reviewed   POCT URINE PREGNANCY          Imaging Results    None                     Scribe Attestation:   Scribe #1: I performed the above scribed service and the documentation accurately describes the services I performed. I attest to the accuracy of the note.                          Clinical Impression:       ICD-10-CM ICD-9-CM   1. Contact dermatitis, unspecified contact dermatitis type, unspecified trigger L25.9 692.9         Disposition:   Disposition: Discharged  Condition: Stable     Scribe Attestation: I, Ilia Marrero MD, personally performed the services described in this documentation. All medical record entries made by the scribe were at my direction and in my presence. I have reviewed the chart and agree that the record reflects my personal performance and is accurate and complete.   ED Disposition Condition    Discharge Stable        ED Prescriptions     Medication Sig Dispense Start Date End Date Auth. Provider    predniSONE (DELTASONE) 20 MG tablet Take 1 tablet (20 mg total) by mouth once daily. for 5 days 10 tablet 2/28/2020 3/4/2020 Ilia Marrero MD        Follow-up Information     Follow up With Specialties Details Why Contact Info    Artis Aaron Jr., MD Family Medicine Call  As needed [id number] [facility]  [address] [facility] [address]  [phone]                                       Ilia Marrero MD  02/28/20 9568

## 2020-02-28 NOTE — ED TRIAGE NOTES
Pt c/o itching all over after using new hair dye, no redness or rashes noted, no difficulty swallowing, pt has not taken anything PTA

## (undated) DEVICE — PAD ABDOMINAL 5X9 STERILE